# Patient Record
Sex: FEMALE | Race: BLACK OR AFRICAN AMERICAN | NOT HISPANIC OR LATINO | URBAN - METROPOLITAN AREA
[De-identification: names, ages, dates, MRNs, and addresses within clinical notes are randomized per-mention and may not be internally consistent; named-entity substitution may affect disease eponyms.]

---

## 2021-10-18 LAB — CRC RECOMMENDATION EXT: NORMAL

## 2022-12-09 DIAGNOSIS — M25.559 HIP PAIN: Primary | ICD-10-CM

## 2022-12-19 ENCOUNTER — OFFICE VISIT (OUTPATIENT)
Dept: ORTHOPEDICS | Facility: CLINIC | Age: 72
End: 2022-12-19
Payer: MEDICARE

## 2022-12-19 VITALS — BODY MASS INDEX: 22.69 KG/M2 | WEIGHT: 136.19 LBS | HEIGHT: 65 IN

## 2022-12-19 DIAGNOSIS — M16.12 PRIMARY OSTEOARTHRITIS OF LEFT HIP: Primary | ICD-10-CM

## 2022-12-19 PROCEDURE — 99203 PR OFFICE/OUTPT VISIT, NEW, LEVL III, 30-44 MIN: ICD-10-PCS | Mod: S$GLB,,, | Performed by: ORTHOPAEDIC SURGERY

## 2022-12-19 PROCEDURE — 1125F AMNT PAIN NOTED PAIN PRSNT: CPT | Mod: CPTII,S$GLB,, | Performed by: ORTHOPAEDIC SURGERY

## 2022-12-19 PROCEDURE — 99203 OFFICE O/P NEW LOW 30 MIN: CPT | Mod: S$GLB,,, | Performed by: ORTHOPAEDIC SURGERY

## 2022-12-19 PROCEDURE — 99999 PR PBB SHADOW E&M-EST. PATIENT-LVL II: CPT | Mod: PBBFAC,,, | Performed by: ORTHOPAEDIC SURGERY

## 2022-12-19 PROCEDURE — 1125F PR PAIN SEVERITY QUANTIFIED, PAIN PRESENT: ICD-10-PCS | Mod: CPTII,S$GLB,, | Performed by: ORTHOPAEDIC SURGERY

## 2022-12-19 PROCEDURE — 1159F PR MEDICATION LIST DOCUMENTED IN MEDICAL RECORD: ICD-10-PCS | Mod: CPTII,S$GLB,, | Performed by: ORTHOPAEDIC SURGERY

## 2022-12-19 PROCEDURE — 1159F MED LIST DOCD IN RCRD: CPT | Mod: CPTII,S$GLB,, | Performed by: ORTHOPAEDIC SURGERY

## 2022-12-19 PROCEDURE — 99999 PR PBB SHADOW E&M-EST. PATIENT-LVL II: ICD-10-PCS | Mod: PBBFAC,,, | Performed by: ORTHOPAEDIC SURGERY

## 2022-12-19 PROCEDURE — 3008F BODY MASS INDEX DOCD: CPT | Mod: CPTII,S$GLB,, | Performed by: ORTHOPAEDIC SURGERY

## 2022-12-19 PROCEDURE — 3008F PR BODY MASS INDEX (BMI) DOCUMENTED: ICD-10-PCS | Mod: CPTII,S$GLB,, | Performed by: ORTHOPAEDIC SURGERY

## 2022-12-19 RX ORDER — IBUPROFEN 800 MG/1
800 TABLET ORAL EVERY 6 HOURS PRN
COMMUNITY
End: 2024-03-05

## 2022-12-19 NOTE — PROGRESS NOTES
NEW PATIENT ORTHOPAEDIC: HIP    PRIMARY CARE PHYSICIAN: Celena Macedo NP   REFERRING PROVIDER: Asiya Guerrier NP  [address] [place] [address]  [place] [address]     ASSESSMENT & PLAN:    Impression:  Left Hip Severe Degenerative Osteoarthritis, Primary    Follow Up Plan: PRN     Non operative care:    Shraddha Rea has physical exam evidence of above and wishes to pursue an non-operative care. I am recommending the following: observation    Patient comes in with a greater than 2 year history of having left hip pain.  She has done extensive nonoperative treatments for this.  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  She is engaged in a lot a natural and holistic strategies including dry needling, acupuncture, chiropractors, supplements all which have assisted with her pain over the last few years.  She only has movement specific pain and is not having pain on a daily basis.  I advised her to continue her current symptomatic treatment strategies as they seem to be working.  Should these fail in the future her option would be consideration of a total hip replacement.    The patient has been ordered:  None    CONSULTS:   None    ACTIVE PROBLEM LIST  There is no problem list on file for this patient.          SUBJECTIVE    CHIEF COMPLAINT: Hip Pain    HPI:   Shraddha Rea is a 72 y.o. female here for evaluation and management of left hip pain. There is not a specific incident that brought about this pain. she has had progressive problems with the hip(s) starting 2 years ago and is progressing which is now interfering with activities which include: exercise    Currently the pain in the joint is mild with activity.  The pain is intermittent and is located in groin.  The pain is described as sharp. Relieving factors include over the counter medication  and repositioning. no associated Catching, Clicking, and Popping.     They do not report leg  "length inequality.     Shraddha Rea has no additional complaints.     PROGRESSIVE SYMPTOMS:  Pain limiting ability to stay fit and healthy    FUNCTIONAL STATUS:   Participate in recreational activities     PREVIOUS TREATMENTS:  Medical: Tylenol  Physical Therapy: Formal Physical Therapy for 6 weeks  Previous Orthopaedic Surgery: None    REVIEW OF SYSTEMS:  PAIN ASSESSMENT:  See HPI.  MUSCULOSKELETAL: See HPI.  OTHER 10 point review of systems is negative except as stated in HPI above    PAST MEDICAL HISTORY   has no past medical history on file.     PAST SURGICAL HISTORY   has a past surgical history that includes Hysterectomy and Appendectomy.     FAMILY HISTORY  family history is not on file.     SOCIAL HISTORY   reports that she has never smoked. She has never been exposed to tobacco smoke. She has never used smokeless tobacco. She reports current alcohol use of about 3.0 standard drinks per week. She reports that she does not use drugs.     ALLERGIES   Review of patient's allergies indicates:   Allergen Reactions    Aspirin     Sulfa (sulfonamide antibiotics)         MEDICATIONS   Current Outpatient Medications on File Prior to Visit   Medication Sig Dispense Refill    ibuprofen (ADVIL,MOTRIN) 800 MG tablet Take 800 mg by mouth every 6 (six) hours as needed for Pain.       No current facility-administered medications on file prior to visit.          PHYSICAL EXAM   height is 5' 5" (1.651 m) and weight is 61.8 kg (136 lb 3.2 oz).   Body mass index is 22.66 kg/m².      All other systems deferred.  GENERAL:  No acute distress  HABITUS: Normal  GAIT: Non-antalgic  SKIN: Normal     HIP EXAM:    left:   ROM:   Flexion: 120 degrees    Internal Rotation: 30 degrees    External Rotation: 30 degrees    Abduction: 45 degrees    Adduction: 20 degrees  No apparent leg length discrepancy    Palpation: no tenderness over greater trochanter, SI joint, ilioposas, IT band, gluteal musculature   Pain is reproduced with IR " of the hip  Strength: 5/5 hip flexion, abduction, knee flexion and extension   Straight leg raise: Negative   Neurovascular Status: Sensation intact to light touch in Sural, saphenous, SPN, DPN, Tibial nerve distribution  2+ pulse DP/PT, normal capillary refill, foot has normal warmth    DATA:  Diagnostic tests reviewed for today's visit:     AP pelvis, hip, and lateral radiographs shows narrowing of the superior joint space with sclerosis and osteophyte formation. The femoral neck is in varus position. The femoral head is spherical at superior margin. There is no signficant evidence of acetabular dysplasia and the femoral head remains covered.

## 2024-03-05 ENCOUNTER — OFFICE VISIT (OUTPATIENT)
Dept: FAMILY MEDICINE | Facility: CLINIC | Age: 74
End: 2024-03-05
Payer: MEDICARE

## 2024-03-05 ENCOUNTER — PATIENT OUTREACH (OUTPATIENT)
Dept: ADMINISTRATIVE | Facility: HOSPITAL | Age: 74
End: 2024-03-05
Payer: MEDICARE

## 2024-03-05 VITALS
TEMPERATURE: 98 F | OXYGEN SATURATION: 96 % | HEIGHT: 65 IN | WEIGHT: 141.13 LBS | HEART RATE: 75 BPM | SYSTOLIC BLOOD PRESSURE: 136 MMHG | BODY MASS INDEX: 23.52 KG/M2 | RESPIRATION RATE: 18 BRPM | DIASTOLIC BLOOD PRESSURE: 76 MMHG

## 2024-03-05 DIAGNOSIS — Z00.00 HEALTHCARE MAINTENANCE: Primary | ICD-10-CM

## 2024-03-05 DIAGNOSIS — M25.559 HIP PAIN, UNSPECIFIED LATERALITY: ICD-10-CM

## 2024-03-05 DIAGNOSIS — Z12.39 ENCOUNTER FOR SCREENING FOR MALIGNANT NEOPLASM OF BREAST, UNSPECIFIED SCREENING MODALITY: ICD-10-CM

## 2024-03-05 DIAGNOSIS — Z12.11 COLON CANCER SCREENING: ICD-10-CM

## 2024-03-05 DIAGNOSIS — T78.40XS ALLERGY, SEQUELA: ICD-10-CM

## 2024-03-05 DIAGNOSIS — R79.9 ABNORMAL FINDING OF BLOOD CHEMISTRY, UNSPECIFIED: ICD-10-CM

## 2024-03-05 DIAGNOSIS — Z78.0 ASYMPTOMATIC POSTMENOPAUSAL STATE: ICD-10-CM

## 2024-03-05 PROCEDURE — 3008F BODY MASS INDEX DOCD: CPT | Mod: CPTII,S$GLB,, | Performed by: INTERNAL MEDICINE

## 2024-03-05 PROCEDURE — 99204 OFFICE O/P NEW MOD 45 MIN: CPT | Mod: S$GLB,,, | Performed by: INTERNAL MEDICINE

## 2024-03-05 PROCEDURE — 99999 PR PBB SHADOW E&M-EST. PATIENT-LVL V: CPT | Mod: PBBFAC,,, | Performed by: INTERNAL MEDICINE

## 2024-03-05 PROCEDURE — 1159F MED LIST DOCD IN RCRD: CPT | Mod: CPTII,S$GLB,, | Performed by: INTERNAL MEDICINE

## 2024-03-05 PROCEDURE — 3075F SYST BP GE 130 - 139MM HG: CPT | Mod: CPTII,S$GLB,, | Performed by: INTERNAL MEDICINE

## 2024-03-05 PROCEDURE — 3078F DIAST BP <80 MM HG: CPT | Mod: CPTII,S$GLB,, | Performed by: INTERNAL MEDICINE

## 2024-03-05 PROCEDURE — 1101F PT FALLS ASSESS-DOCD LE1/YR: CPT | Mod: CPTII,S$GLB,, | Performed by: INTERNAL MEDICINE

## 2024-03-05 PROCEDURE — 3288F FALL RISK ASSESSMENT DOCD: CPT | Mod: CPTII,S$GLB,, | Performed by: INTERNAL MEDICINE

## 2024-03-05 PROCEDURE — 1160F RVW MEDS BY RX/DR IN RCRD: CPT | Mod: CPTII,S$GLB,, | Performed by: INTERNAL MEDICINE

## 2024-03-05 RX ORDER — MELOXICAM 7.5 MG/1
7.5 TABLET ORAL DAILY PRN
COMMUNITY
Start: 2024-01-24

## 2024-03-05 RX ORDER — AMLODIPINE BESYLATE 5 MG/1
5 TABLET ORAL
COMMUNITY
Start: 2023-10-23 | End: 2024-03-05

## 2024-03-05 NOTE — PROGRESS NOTES
Mammogram and DEXA updated in . JEREMY sent requesting colonoscopy. Immunization's updated/triggered.

## 2024-03-05 NOTE — PROGRESS NOTES
HISTORY OF PRESENT ILLNESS:  Shraddha Rea is a 73 y.o. female who presents to the clinic today for Establish Care    My first encounter with patient.    Previously seen at [facility] and establishing with us.    Notes hip pain for which saw Dr. Melendez before.  Symptoms are stable at this time.  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  Does go to a chiropractor as well.  Had dry needling at [facility] which helped.  Attended PT last year there for hip.    No other complaints noted.    PAST MEDICAL HISTORY:  No past medical history on file.    PAST SURGICAL HISTORY:  Past Surgical History:   Procedure Laterality Date    APPENDECTOMY      HYSTERECTOMY         SOCIAL HISTORY:  Social History     Socioeconomic History    Marital status:     Number of children: 2   Tobacco Use    Smoking status: Never     Passive exposure: Never    Smokeless tobacco: Never   Substance and Sexual Activity    Alcohol use: Not Currently     Comment: occasional social    Drug use: Never    Sexual activity: Not Currently     Social Determinants of Health     Financial Resource Strain: Low Risk  (2/27/2024)    Overall Financial Resource Strain (CARDIA)     Difficulty of Paying Living Expenses: Not hard at all   Food Insecurity: No Food Insecurity (2/27/2024)    Hunger Vital Sign     Worried About Running Out of Food in the Last Year: Never true     Ran Out of Food in the Last Year: Never true   Transportation Needs: No Transportation Needs (2/27/2024)    PRAPARE - Transportation     Lack of Transportation (Medical): No     Lack of Transportation (Non-Medical): No   Physical Activity: Sufficiently Active (2/27/2024)    Exercise Vital Sign     Days of Exercise per Week: 3 days     Minutes of Exercise per Session: 60 min   Stress: No Stress Concern Present (2/27/2024)    [employer] [place] [employer] - Occupational Stress Questionnaire     Feeling of Stress : Not at all   Social Connections:  Unknown (2/27/2024)    Social Connection and Isolation Panel [NHANES]     Frequency of Communication with Friends and Family: More than three times a week     Frequency of Social Gatherings with Friends and Family: Twice a week     Active Member of Clubs or Organizations: Yes     Attends Club or Organization Meetings: More than 4 times per year     Marital Status:    Housing Stability: Low Risk  (2/27/2024)    Housing Stability Vital Sign     Unable to Pay for Housing in the Last Year: No     Number of Places Lived in the Last Year: 1     Unstable Housing in the Last Year: No       FAMILY HISTORY:  Family History   Problem Relation Age of Onset    Breast cancer Mother 60    Lung cancer Sister     Lupus Sister     Cancer Maternal Grandmother        ALLERGIES AND MEDICATIONS: updated and reviewed.  Review of patient's allergies indicates:   Allergen Reactions    Mold Shortness Of Breath    Aspirin     Grass pollen-kiran grass standard Itching    Sulfa (sulfonamide antibiotics)     Milk containing products (dairy) Nausea And Vomiting and Other (See Comments)     Medication List with Changes/Refills   Current Medications    MELOXICAM (MOBIC) 7.5 MG TABLET    Take 7.5 mg by mouth daily as needed for Pain (take with food and plenty of water).   Discontinued Medications    AMLODIPINE (NORVASC) 5 MG TABLET    Take 5 mg by mouth.    IBUPROFEN (ADVIL,MOTRIN) 800 MG TABLET    Take 800 mg by mouth every 6 (six) hours as needed for Pain.          CARE TEAM:  Patient Care Team:  Jaswant Live MD as PCP - General (Internal Medicine)         REVIEW OF SYSTEMS:  Review of Systems   Constitutional:  Negative for chills and fever.   HENT:  Negative for congestion and postnasal drip.    Eyes:  Negative for photophobia and visual disturbance.   Respiratory:  Negative for cough and shortness of breath.    Cardiovascular:  Negative for chest pain and palpitations.   Gastrointestinal:  Negative for nausea and vomiting.    Genitourinary:  Negative for dysuria and frequency.   Musculoskeletal:  Positive for arthralgias (left hip; right hip and gluteal area when sitting).   Neurological:  Negative for light-headedness and headaches.   Psychiatric/Behavioral:  Negative for dysphoric mood. The patient is not nervous/anxious.          PHYSICAL EXAM:   Vitals:    03/05/24 1018   BP: 136/76   Pulse: 75   Resp: 18   Temp: 98.3 °F (36.8 °C)             Body mass index is 23.48 kg/m².     General appearance - alert, well appearing, and in no distress and normal appearing weight  Mental status - normal mood, behavior, speech, dress, motor activity, and thought processes  Eyes - sclera anicteric, left eye normal, right eye normal  Chest - clear to auscultation, no wheezes, rales or rhonchi, symmetric air entry  Heart - normal rate, regular rhythm, normal S1, S2, no murmurs, rubs, clicks or gallops  Neurological - alert, oriented, normal speech, no focal findings or movement disorder noted  Extremities - no pedal edema, no clubbing or cyanosis      ASSESSMENT AND PLAN:  Healthcare maintenance  -     Hemoglobin A1C; Future; Expected date: 03/05/2024  -     Comprehensive Metabolic Panel; Future; Expected date: 03/05/2024  -     Lipid Panel; Future; Expected date: 03/05/2024  -     CBC Auto Differential; Future; Expected date: 03/05/2024  -     TSH; Future; Expected date: 03/05/2024  -     Hepatitis C Antibody; Future; Expected date: 03/05/2024    Encounter for screening for malignant neoplasm of breast, unspecified screening modality  Colon cancer screening  Asymptomatic postmenopausal state        - Obtain prior records [facility].    Hip pain, unspecified laterality  -     X-Ray Hips Bilateral 2 View Inc AP Pelvis; Future; Expected date: 03/05/2024    Allergy, sequela  -     Ambulatory referral/consult to Allergy; Future; Expected date: 03/12/2024    Abnormal finding of blood chemistry, unspecified  -     Hemoglobin A1C; Future; Expected date:  03/05/2024  -     Lipid Panel; Future; Expected date: 03/05/2024  -     CBC Auto Differential; Future; Expected date: 03/05/2024  -     TSH; Future; Expected date: 03/05/2024              Follow up 6 months or sooner as needed.

## 2024-03-05 NOTE — PROGRESS NOTES
Health Maintenance Due   Topic     Hepatitis C Screening  Consult pcp    Lipid Panel  Consult pcp    TETANUS VACCINE  Pt decline    Colorectal Cancer Screening  Consult pcp    RSV Vaccine (Age 60+ and Pregnant patients) (1 - 1-dose 60+ series) Not offered at this office    Pneumococcal Vaccines (Age 65+) (1 of 1 - PCV) Pt decline

## 2024-03-05 NOTE — LETTER
AUTHORIZATION FOR RELEASE OF   CONFIDENTIAL INFORMATION        We are seeing Shraddha Rea, date of birth 1950, in the clinic at [facility]. Jaswant Live MD is the patient's PCP. Shraddha Rea has an outstanding lab/procedure at the time we reviewed her chart. In order to help keep her health information updated, she has authorized us to request the following medical record(s):        (  )  MAMMOGRAM                                      ( X )  COLONOSCOPY      (  )  PAP SMEAR                                          (  )  OUTSIDE LAB RESULTS     (  )  DEXA SCAN                                          (  )  EYE EXAM            (  )  FOOT EXAM                                          (  )  ENTIRE RECORD     (  )  OUTSIDE IMMUNIZATIONS                 (  )  _______________         Please fax records to [facility], Anand, Kiran K., MD, FAX [phone] [address] [facility] [address]       If you have any questions, please contact Zohaib Fuller MA,[facility] at [phone].              Patient Name: Shraddha Rea  : 1950  Patient Phone #: [phone]

## 2024-03-06 ENCOUNTER — PATIENT OUTREACH (OUTPATIENT)
Dept: ADMINISTRATIVE | Facility: HOSPITAL | Age: 74
End: 2024-03-06
Payer: MEDICARE

## 2024-03-19 ENCOUNTER — LAB VISIT (OUTPATIENT)
Dept: LAB | Facility: HOSPITAL | Age: 74
End: 2024-03-19
Payer: MEDICARE

## 2024-03-19 DIAGNOSIS — R79.9 ABNORMAL FINDING OF BLOOD CHEMISTRY, UNSPECIFIED: ICD-10-CM

## 2024-03-19 DIAGNOSIS — Z00.00 HEALTHCARE MAINTENANCE: ICD-10-CM

## 2024-03-19 LAB
ALBUMIN SERPL BCP-MCNC: 3.8 G/DL (ref 3.5–5.2)
ALP SERPL-CCNC: 107 U/L (ref 55–135)
ALT SERPL W/O P-5'-P-CCNC: 9 U/L (ref 10–44)
ANION GAP SERPL CALC-SCNC: 3 MMOL/L (ref 8–16)
AST SERPL-CCNC: 22 U/L (ref 10–40)
BASOPHILS # BLD AUTO: 0.02 K/UL (ref 0–0.2)
BASOPHILS NFR BLD: 0.5 % (ref 0–1.9)
BILIRUB SERPL-MCNC: 0.5 MG/DL (ref 0.1–1)
BUN SERPL-MCNC: 12 MG/DL (ref 8–23)
CALCIUM SERPL-MCNC: 9.4 MG/DL (ref 8.7–10.5)
CHLORIDE SERPL-SCNC: 109 MMOL/L (ref 95–110)
CHOLEST SERPL-MCNC: 215 MG/DL (ref 120–199)
CHOLEST/HDLC SERPL: 3.4 {RATIO} (ref 2–5)
CO2 SERPL-SCNC: 30 MMOL/L (ref 23–29)
CREAT SERPL-MCNC: 0.9 MG/DL (ref 0.5–1.4)
DIFFERENTIAL METHOD BLD: NORMAL
EOSINOPHIL # BLD AUTO: 0.1 K/UL (ref 0–0.5)
EOSINOPHIL NFR BLD: 2.5 % (ref 0–8)
ERYTHROCYTE [DISTWIDTH] IN BLOOD BY AUTOMATED COUNT: 13 % (ref 11.5–14.5)
EST. GFR  (NO RACE VARIABLE): >60 ML/MIN/1.73 M^2
ESTIMATED AVG GLUCOSE: 108 MG/DL (ref 68–131)
GLUCOSE SERPL-MCNC: 87 MG/DL (ref 70–110)
HBA1C MFR BLD: 5.4 % (ref 4–5.6)
HCT VFR BLD AUTO: 38.2 % (ref 37–48.5)
HCV AB SERPL QL IA: NORMAL
HDLC SERPL-MCNC: 64 MG/DL (ref 40–75)
HDLC SERPL: 29.8 % (ref 20–50)
HGB BLD-MCNC: 12.6 G/DL (ref 12–16)
IMM GRANULOCYTES # BLD AUTO: 0 K/UL (ref 0–0.04)
IMM GRANULOCYTES NFR BLD AUTO: 0 % (ref 0–0.5)
LDLC SERPL CALC-MCNC: 129.6 MG/DL (ref 63–159)
LYMPHOCYTES # BLD AUTO: 1.4 K/UL (ref 1–4.8)
LYMPHOCYTES NFR BLD: 36.3 % (ref 18–48)
MCH RBC QN AUTO: 30.9 PG (ref 27–31)
MCHC RBC AUTO-ENTMCNC: 33 G/DL (ref 32–36)
MCV RBC AUTO: 94 FL (ref 82–98)
MONOCYTES # BLD AUTO: 0.3 K/UL (ref 0.3–1)
MONOCYTES NFR BLD: 8.4 % (ref 4–15)
NEUTROPHILS # BLD AUTO: 2.1 K/UL (ref 1.8–7.7)
NEUTROPHILS NFR BLD: 52.3 % (ref 38–73)
NONHDLC SERPL-MCNC: 151 MG/DL
NRBC BLD-RTO: 0 /100 WBC
PLATELET # BLD AUTO: 237 K/UL (ref 150–450)
PMV BLD AUTO: 10.5 FL (ref 9.2–12.9)
POTASSIUM SERPL-SCNC: 4.5 MMOL/L (ref 3.5–5.1)
PROT SERPL-MCNC: 6.6 G/DL (ref 6–8.4)
RBC # BLD AUTO: 4.08 M/UL (ref 4–5.4)
SODIUM SERPL-SCNC: 142 MMOL/L (ref 136–145)
TRIGL SERPL-MCNC: 107 MG/DL (ref 30–150)
TSH SERPL DL<=0.005 MIU/L-ACNC: 2.54 UIU/ML (ref 0.4–4)
WBC # BLD AUTO: 3.94 K/UL (ref 3.9–12.7)

## 2024-03-19 PROCEDURE — 84443 ASSAY THYROID STIM HORMONE: CPT | Mod: GA | Performed by: INTERNAL MEDICINE

## 2024-03-19 PROCEDURE — 36415 COLL VENOUS BLD VENIPUNCTURE: CPT | Mod: PN | Performed by: INTERNAL MEDICINE

## 2024-03-19 PROCEDURE — 86803 HEPATITIS C AB TEST: CPT | Performed by: INTERNAL MEDICINE

## 2024-03-19 PROCEDURE — 80061 LIPID PANEL: CPT | Performed by: INTERNAL MEDICINE

## 2024-03-19 PROCEDURE — 85025 COMPLETE CBC W/AUTO DIFF WBC: CPT | Performed by: INTERNAL MEDICINE

## 2024-03-19 PROCEDURE — 80053 COMPREHEN METABOLIC PANEL: CPT | Performed by: INTERNAL MEDICINE

## 2024-03-19 PROCEDURE — 83036 HEMOGLOBIN GLYCOSYLATED A1C: CPT | Performed by: INTERNAL MEDICINE

## 2024-03-25 ENCOUNTER — PATIENT MESSAGE (OUTPATIENT)
Dept: ORTHOPEDICS | Facility: CLINIC | Age: 74
End: 2024-03-25

## 2024-03-25 ENCOUNTER — OFFICE VISIT (OUTPATIENT)
Dept: ORTHOPEDICS | Facility: CLINIC | Age: 74
End: 2024-03-25
Payer: MEDICARE

## 2024-03-25 VITALS — HEIGHT: 65 IN | BODY MASS INDEX: 23.52 KG/M2 | WEIGHT: 141.13 LBS

## 2024-03-25 DIAGNOSIS — M16.12 PRIMARY OSTEOARTHRITIS OF LEFT HIP: Primary | ICD-10-CM

## 2024-03-25 PROCEDURE — 1125F AMNT PAIN NOTED PAIN PRSNT: CPT | Mod: CPTII,S$GLB,, | Performed by: ORTHOPAEDIC SURGERY

## 2024-03-25 PROCEDURE — 3008F BODY MASS INDEX DOCD: CPT | Mod: CPTII,S$GLB,, | Performed by: ORTHOPAEDIC SURGERY

## 2024-03-25 PROCEDURE — 99213 OFFICE O/P EST LOW 20 MIN: CPT | Mod: S$GLB,,, | Performed by: ORTHOPAEDIC SURGERY

## 2024-03-25 PROCEDURE — 99999 PR PBB SHADOW E&M-EST. PATIENT-LVL II: CPT | Mod: PBBFAC,,, | Performed by: ORTHOPAEDIC SURGERY

## 2024-03-25 PROCEDURE — 3044F HG A1C LEVEL LT 7.0%: CPT | Mod: CPTII,S$GLB,, | Performed by: ORTHOPAEDIC SURGERY

## 2024-03-25 PROCEDURE — 1159F MED LIST DOCD IN RCRD: CPT | Mod: CPTII,S$GLB,, | Performed by: ORTHOPAEDIC SURGERY

## 2024-03-25 RX ORDER — DICLOFENAC SODIUM 75 MG/1
75 TABLET, DELAYED RELEASE ORAL 2 TIMES DAILY
Qty: 60 TABLET | Refills: 1 | Status: SHIPPED | OUTPATIENT
Start: 2024-03-25

## 2024-03-25 NOTE — PROGRESS NOTES
EST PATIENT ORTHOPAEDIC: HIP    PRIMARY CARE PHYSICIAN: Jaswant Live MD   REFERRING PROVIDER: No referring provider defined for this encounter.     ASSESSMENT & PLAN:    Impression:  Left Hip Severe Degenerative Osteoarthritis, Primary    Follow Up Plan: PRN     Non operative care:    Shraddha Rea has physical exam evidence of above and wishes to pursue an non-operative care. I am recommending the following: observation, consideration of surgery in the future    To review:  Patient comes in with a greater than 3 year history of having left hip pain.  She has done extensive nonoperative treatments for this.  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  She is engaged in a lot a natural and holistic strategies including dry needling, acupuncture, chiropractors, supplements all which have assisted with her pain over the last few years.  She only has movement specific pain and is not having pain on a daily basis.  I advised her to continue her current symptomatic treatment strategies as they seem to be working.  Will add diclofenac to this. Weather affects her pain. She is working [place] [facility] upcoming. No limited by weight pain in general. She has also been aided by dry needling in the past. Should these fail in the future her option would be consideration of a total hip replacement.     The patient has been ordered:  Pain prescription    CONSULTS:   None    ACTIVE PROBLEM LIST  There is no problem list on file for this patient.          SUBJECTIVE    CHIEF COMPLAINT: Hip Pain    HPI:   Shraddha Rea is a 73 y.o. female here for evaluation and management of left hip pain. There is not a specific incident that brought about this pain. she has had progressive problems with the hip(s) starting 2 years ago and is progressing which is now interfering with activities which include: exercise    Currently the pain in the joint is mild with  activity.  The pain is intermittent and is located in groin.  The pain is described as sharp. Relieving factors include over the counter medication  and repositioning. no associated Catching, Clicking, and Popping.     They do not report leg length inequality.     Shraddha Rea has no additional complaints.     3/25/24:  Patient here for follow up of ongoing left hip pain.  Last saw about 2 years ago.  Pain is similar in nature but ongoing and wanted follow up established.    PROGRESSIVE SYMPTOMS:  Pain limiting ability to stay fit and healthy    FUNCTIONAL STATUS:   Participate in recreational activities     PREVIOUS TREATMENTS:  Medical: Tylenol  Physical Therapy: Formal Physical Therapy for 6 weeks  Previous Orthopaedic Surgery: None    REVIEW OF SYSTEMS:  PAIN ASSESSMENT:  See HPI.  MUSCULOSKELETAL: See HPI.  OTHER 10 point review of systems is negative except as stated in HPI above    PAST MEDICAL HISTORY   has a past medical history of Personal history of colonic polyps (10/18/2021).     PAST SURGICAL HISTORY   has a past surgical history that includes Hysterectomy and Appendectomy.     FAMILY HISTORY  family history includes Breast cancer (age of onset: 60) in her mother; Cancer in her maternal grandmother; Lung cancer in her sister; Lupus in her sister.     SOCIAL HISTORY   reports that she has never smoked. She has never been exposed to tobacco smoke. She has never used smokeless tobacco. She reports that she does not currently use alcohol. She reports that she does not use drugs.     ALLERGIES   Review of patient's allergies indicates:   Allergen Reactions    Mold Shortness Of Breath    Aspirin     Grass pollen-june grass standard Itching    Sulfa (sulfonamide antibiotics)     Milk containing products (dairy) Nausea And Vomiting and Other (See Comments)        MEDICATIONS   Current Outpatient Medications on File Prior to Visit   Medication Sig Dispense Refill    meloxicam (MOBIC) 7.5 MG tablet Take  "7.5 mg by mouth daily as needed for Pain (take with food and plenty of water).       No current facility-administered medications on file prior to visit.          PHYSICAL EXAM   height is 5' 5" (1.651 m) and weight is 64 kg (141 lb 1.5 oz).   Body mass index is 23.48 kg/m².      All other systems deferred.  GENERAL:  No acute distress  HABITUS: Normal  GAIT: Non-antalgic  SKIN: Normal     HIP EXAM:    left:   ROM:   Flexion: 120 degrees    Internal Rotation: 30 degrees    External Rotation: 30 degrees    Abduction: 45 degrees    Adduction: 20 degrees  No apparent leg length discrepancy    Palpation: no tenderness over greater trochanter, SI joint, ilioposas, IT band, gluteal musculature   Pain is reproduced with IR of the hip  Strength: 5/5 hip flexion, abduction, knee flexion and extension   Straight leg raise: Negative   Neurovascular Status: Sensation intact to light touch in Sural, saphenous, SPN, DPN, Tibial nerve distribution  2+ pulse DP/PT, normal capillary refill, foot has normal warmth    DATA:  Diagnostic tests reviewed for today's visit:     AP pelvis, hip, and lateral radiographs shows narrowing of the medial joint space with sclerosis and osteophyte formation. The femoral neck is in varus position. The femoral head is spherical at superior margin. There is no signficant evidence of acetabular dysplasia and the femoral head remains covered.      "

## 2024-04-30 ENCOUNTER — PATIENT MESSAGE (OUTPATIENT)
Dept: ORTHOPEDICS | Facility: CLINIC | Age: 74
End: 2024-04-30
Payer: MEDICARE

## 2024-07-08 ENCOUNTER — PATIENT MESSAGE (OUTPATIENT)
Dept: ORTHOPEDICS | Facility: CLINIC | Age: 74
End: 2024-07-08
Payer: MEDICARE

## 2024-07-09 DIAGNOSIS — M16.12 PRIMARY OSTEOARTHRITIS OF LEFT HIP: ICD-10-CM

## 2024-07-09 RX ORDER — DICLOFENAC SODIUM 75 MG/1
75 TABLET, DELAYED RELEASE ORAL 2 TIMES DAILY
Qty: 60 TABLET | Refills: 1 | Status: SHIPPED | OUTPATIENT
Start: 2024-07-09

## 2024-07-10 ENCOUNTER — PATIENT MESSAGE (OUTPATIENT)
Facility: CLINIC | Age: 74
End: 2024-07-10

## 2024-07-10 ENCOUNTER — OFFICE VISIT (OUTPATIENT)
Facility: CLINIC | Age: 74
End: 2024-07-10
Payer: MEDICARE

## 2024-07-10 VITALS
HEART RATE: 76 BPM | WEIGHT: 136.44 LBS | SYSTOLIC BLOOD PRESSURE: 158 MMHG | OXYGEN SATURATION: 99 % | DIASTOLIC BLOOD PRESSURE: 78 MMHG | RESPIRATION RATE: 14 BRPM | BODY MASS INDEX: 23.29 KG/M2 | TEMPERATURE: 98 F | HEIGHT: 64 IN

## 2024-07-10 DIAGNOSIS — Z00.00 ENCOUNTER FOR PREVENTIVE HEALTH EXAMINATION: ICD-10-CM

## 2024-07-10 DIAGNOSIS — Z00.00 ENCOUNTER FOR MEDICARE ANNUAL WELLNESS EXAM: ICD-10-CM

## 2024-07-10 DIAGNOSIS — R03.0 WHITE COAT SYNDROME WITHOUT DIAGNOSIS OF HYPERTENSION: ICD-10-CM

## 2024-07-10 DIAGNOSIS — Z71.89 ADVANCED DIRECTIVES, COUNSELING/DISCUSSION: ICD-10-CM

## 2024-07-10 DIAGNOSIS — M16.12 OSTEOARTHRITIS OF LEFT HIP, UNSPECIFIED OSTEOARTHRITIS TYPE: ICD-10-CM

## 2024-07-10 PROBLEM — I70.0 ATHEROSCLEROSIS OF AORTA: Status: ACTIVE | Noted: 2024-07-10

## 2024-07-10 PROCEDURE — 1101F PT FALLS ASSESS-DOCD LE1/YR: CPT | Mod: CPTII,S$GLB,, | Performed by: NURSE PRACTITIONER

## 2024-07-10 PROCEDURE — 99999 PR PBB SHADOW E&M-EST. PATIENT-LVL IV: CPT | Mod: PBBFAC,,, | Performed by: NURSE PRACTITIONER

## 2024-07-10 PROCEDURE — G0438 PPPS, INITIAL VISIT: HCPCS | Mod: S$GLB,,, | Performed by: NURSE PRACTITIONER

## 2024-07-10 PROCEDURE — 3288F FALL RISK ASSESSMENT DOCD: CPT | Mod: CPTII,S$GLB,, | Performed by: NURSE PRACTITIONER

## 2024-07-10 PROCEDURE — 1170F FXNL STATUS ASSESSED: CPT | Mod: CPTII,S$GLB,, | Performed by: NURSE PRACTITIONER

## 2024-07-10 PROCEDURE — 3078F DIAST BP <80 MM HG: CPT | Mod: CPTII,S$GLB,, | Performed by: NURSE PRACTITIONER

## 2024-07-10 PROCEDURE — 3077F SYST BP >= 140 MM HG: CPT | Mod: CPTII,S$GLB,, | Performed by: NURSE PRACTITIONER

## 2024-07-10 PROCEDURE — 3044F HG A1C LEVEL LT 7.0%: CPT | Mod: CPTII,S$GLB,, | Performed by: NURSE PRACTITIONER

## 2024-07-10 RX ORDER — CETIRIZINE HYDROCHLORIDE 10 MG/1
10 TABLET ORAL DAILY PRN
COMMUNITY

## 2024-07-10 RX ORDER — FEXOFENADINE HCL AND PSEUDOEPHEDRINE HCI 60; 120 MG/1; MG/1
1 TABLET, EXTENDED RELEASE ORAL 2 TIMES DAILY
COMMUNITY
End: 2024-07-10

## 2024-07-10 RX ORDER — FEXOFENADINE HCL AND PSEUDOEPHEDRINE HCI 180; 240 MG/1; MG/1
1 TABLET, EXTENDED RELEASE ORAL DAILY
COMMUNITY
End: 2024-07-10

## 2024-07-10 RX ORDER — FLUTICASONE PROPIONATE 50 MCG
1 SPRAY, SUSPENSION (ML) NASAL
COMMUNITY

## 2024-07-10 NOTE — PROGRESS NOTES
"Shraddha Rea presented for an initial Medicare AWV today. The following components were reviewed and updated:    Medical history  Family History  Social history  Allergies and Current Medications  Health Risk Assessment  Health Maintenance  Care Team    **See Completed Assessments for Annual Wellness visit with in the encounter summary    The following assessments were completed:  Living Situation  Depression Screening  Cognitive function Screening  Timed Get Up Test  Whisper Test  ADL  PAQ      Opioid documentation:      Patient does not have a current opioid prescription.          Vitals:    07/10/24 1014 07/10/24 1044   BP: (!) 180/90 (!) 158/78   BP Location: Left arm Left arm   Patient Position: Sitting Sitting   BP Method: Medium (Manual) Medium (Manual)   Pulse: 76    Resp: 14    Temp: 98.4 °F (36.9 °C)    TempSrc: Oral    SpO2: 99%    Weight: 61.9 kg (136 lb 7.4 oz)    Height: 5' 4" (1.626 m)      Body mass index is 23.42 kg/m².     Review of Systems   Constitutional:  Negative for chills, fever and weight loss.   HENT:  Negative for ear discharge, ear pain and tinnitus.    Eyes:  Negative for blurred vision and double vision.   Respiratory:  Negative for cough and shortness of breath.    Cardiovascular:  Negative for chest pain, palpitations, orthopnea and leg swelling.   Gastrointestinal:  Negative for constipation and diarrhea.   Genitourinary:  Negative for dysuria and hematuria.   Musculoskeletal:  Positive for joint pain. Negative for back pain and myalgias.   Skin:  Negative for itching and rash.   Neurological:  Negative for dizziness and headaches.   Endo/Heme/Allergies:  Does not bruise/bleed easily.   Psychiatric/Behavioral:  Negative for depression. The patient does not have insomnia.       Physical Exam  Constitutional:       General: She is not in acute distress.     Appearance: She is not ill-appearing, toxic-appearing or diaphoretic.   HENT:      Head: Normocephalic and atraumatic.      " Right Ear: External ear normal.      Left Ear: External ear normal.      Nose: No congestion or rhinorrhea.   Eyes:      Pupils: Pupils are equal, round, and reactive to light.   Cardiovascular:      Rate and Rhythm: Normal rate and regular rhythm.   Pulmonary:      Effort: No respiratory distress.      Breath sounds: No wheezing.   Abdominal:      General: Bowel sounds are normal.   Musculoskeletal:         General: Normal range of motion.      Cervical back: Normal range of motion.   Skin:     General: Skin is warm and dry.   Neurological:      General: No focal deficit present.      Mental Status: She is oriented to person, place, and time.   Psychiatric:         Mood and Affect: Mood normal.         Thought Content: Thought content normal.       Media Information    File Link    Scan on 7/10/2024 11:12 AM by Lakeisha Kellogg APRN: Clock Draw Test      Diagnoses and health risks identified today and associated recommendations/orders:  1. Encounter for Medicare annual wellness exam  -Counseled on age appropriate medical preventative services including age appropriate cancer screenings, age appropriate eye and dental exams, over all nutritional health, need for a consistent exercise regimen, and an over all push towards maintaining a vigorous and active lifestyle.  Counseled on age appropriate vaccines and discussed upcoming health care needs based on age/gender. Discussed good sleep hygiene and stress management.        2. Encounter for preventive health examination  -Counseled on age appropriate medical preventative services including age appropriate cancer screenings, age appropriate eye and dental exams, over all nutritional health, need for a consistent exercise regimen, and an over all push towards maintaining a vigorous and active lifestyle.  Counseled on age appropriate vaccines and discussed upcoming health care needs based on age/gender. Discussed good sleep hygiene and stress management.         3. Osteoarthritis of left hip, unspecified osteoarthritis type  -Continue current regimen  -Scheduled for Hip Replacement in September 2024    4. White coat syndrome without diagnosis of hypertension  -BP elevated in clinic; Denies cardiac symptoms  -Patient advised to monitor BP at home, keep a log, and send to PCP    5. Advanced directives, counseling/discussion  -I offered to discuss advanced care planning, including how to pick a person who would make decisions for you if you were unable to make them for yourself, called a health care power of , and what kind of decisions you might make such as use of life sustaining treatments such as ventilators and tube feeding when faced with a life limiting illness recorded on a living will that they will need to know. (How you want to be cared for as you near the end of your natural life)   -Patient has an Advanced Directive      Provided Shraddha with a 5-10 year written screening schedule and personal prevention plan. Recommendations were developed using the USPSTF age appropriate recommendations. Education, counseling, and referrals were provided as needed.  After Visit Summary printed and given to patient which includes a list of additional screenings\tests needed.    Follow up in about 1 year (around 7/10/2025) for AWV.      FREDY Casper

## 2024-07-10 NOTE — PATIENT INSTRUCTIONS
It was a pleasure to meet you.      Follow up in 1 year for AWV.     Please Follow up with PCP for Routine Care.     Monitor BP at home, keep a log, and send to PCP.         The following information is provided to all patients.  This information is to help you find resources for any of the problems found today that may be affecting your health:                Living healthy guide: www.[facility].[place].[facility]       Understanding Diabetes: www.diabetes.org       Eating healthy: www.cdc.gov/healthyweight      CDC home safety checklist: www.cdc.gov/steadi/patient.html      Agency on Aging: www.goea.[place].[facility]       Alcoholics anonymous (AA): www.aa.org      Physical Activity: www.henrry.nih.gov/ta4anyq       Tobacco use: www.quitwithusla.org

## 2024-07-24 ENCOUNTER — PATIENT MESSAGE (OUTPATIENT)
Facility: CLINIC | Age: 74
End: 2024-07-24
Payer: MEDICARE

## 2024-07-24 ENCOUNTER — PATIENT MESSAGE (OUTPATIENT)
Dept: ORTHOPEDICS | Facility: CLINIC | Age: 74
End: 2024-07-24
Payer: MEDICARE

## 2024-08-05 ENCOUNTER — PATIENT OUTREACH (OUTPATIENT)
Dept: ADMINISTRATIVE | Facility: HOSPITAL | Age: 74
End: 2024-08-05
Payer: MEDICARE

## 2024-08-10 ENCOUNTER — PATIENT MESSAGE (OUTPATIENT)
Dept: FAMILY MEDICINE | Facility: CLINIC | Age: 74
End: 2024-08-10
Payer: MEDICARE

## 2024-08-22 ENCOUNTER — OFFICE VISIT (OUTPATIENT)
Dept: ORTHOPEDICS | Facility: CLINIC | Age: 74
End: 2024-08-22
Payer: MEDICARE

## 2024-08-22 ENCOUNTER — PATIENT MESSAGE (OUTPATIENT)
Dept: ORTHOPEDICS | Facility: CLINIC | Age: 74
End: 2024-08-22

## 2024-08-22 VITALS — HEIGHT: 64 IN | WEIGHT: 136.44 LBS | BODY MASS INDEX: 23.29 KG/M2

## 2024-08-22 DIAGNOSIS — M16.12 PRIMARY OSTEOARTHRITIS OF LEFT HIP: Primary | ICD-10-CM

## 2024-08-22 PROCEDURE — 1159F MED LIST DOCD IN RCRD: CPT | Mod: CPTII,S$GLB,, | Performed by: ORTHOPAEDIC SURGERY

## 2024-08-22 PROCEDURE — 3008F BODY MASS INDEX DOCD: CPT | Mod: CPTII,S$GLB,, | Performed by: ORTHOPAEDIC SURGERY

## 2024-08-22 PROCEDURE — 99214 OFFICE O/P EST MOD 30 MIN: CPT | Mod: S$GLB,,, | Performed by: ORTHOPAEDIC SURGERY

## 2024-08-22 PROCEDURE — 1125F AMNT PAIN NOTED PAIN PRSNT: CPT | Mod: CPTII,S$GLB,, | Performed by: ORTHOPAEDIC SURGERY

## 2024-08-22 PROCEDURE — 3044F HG A1C LEVEL LT 7.0%: CPT | Mod: CPTII,S$GLB,, | Performed by: ORTHOPAEDIC SURGERY

## 2024-08-22 PROCEDURE — 99999 PR PBB SHADOW E&M-EST. PATIENT-LVL III: CPT | Mod: PBBFAC,,, | Performed by: ORTHOPAEDIC SURGERY

## 2024-08-22 NOTE — PROGRESS NOTES
EST PATIENT ORTHOPAEDIC: HIP    PRIMARY CARE PHYSICIAN: Jaswant Live MD   REFERRING PROVIDER: No referring provider defined for this encounter.     ASSESSMENT & PLAN:    Impression:  Left Hip Severe Degenerative Osteoarthritis, Primary    Follow Up Plan: 3 weeks after surgery    Shraddha Rea has radiograph and physical exam evidence of the aforementioned impression and wishes to pursue surgery. This patient appears to have sufficient symptoms to warrant surgical intervention and is an appropriate candidate for Primary Total Hip Arthroplasty as evidenced by months of unsuccessful non-operative treatment as outlined in the HPI below and progressive symptoms.    We had a lengthy discussion regarding the risk and benefit of surgery, the alternatives, limitations and personnel involved. These included but were not limited to infection, persistent pain, instability, nerve injury, blood clots, dislocation, loosening, leg length inequality and medical complications. We also discussed the pre-operative course, surgery itself and rehabilitation.    Jayna-operative blood management and transfusion issues were discussed, and options clearly outlined. The patient has consented to the use of the banked allogenic blood if medically necessary.    The patient has elected to schedule surgery at this time or intends to call the office with a surgical date. Shared decision making occurred while obtaining informed consent. The patient will be scheduled for a pre-operative education class at which time they will have their nasal swab completed and will be given CHG cloths along with the verbal and written instructions for their use.    We will plan for use of Eddi components, Richardson wright.  Tentative surgery date for 10/16/2024.  Patient requests same-day discharge.    To review:  Patient comes in with a greater than 3 year history of having left hip pain.  She has done extensive nonoperative treatments for this.  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  She is engaged in a lot a natural and holistic strategies including dry needling, acupuncture, chiropractors, supplements all which have assisted with her pain over the last few years.  She only has movement specific pain and is not having pain on a daily basis.  I advised her to continue her current symptomatic treatment strategies as they seem to be working.  Will add diclofenac to this. Weather affects her pain. She is working [employer] upcoming. No limited by weight pain in general. She has also been aided by dry needling in the past. Should these fail in the future her option would be consideration of a total hip replacement.     The patient has been ordered:  HEMANTH CT    CONSULTS:   Medicine for clearance  Anesthesia for optimization    ACTIVE PROBLEM LIST  Patient Active Problem List   Diagnosis   (none) - all problems resolved or deleted           SUBJECTIVE    CHIEF COMPLAINT: Hip Pain    HPI:   Shraddha Rea is a 73 y.o. female here for evaluation and management of left hip pain. There is not a specific incident that brought about this pain. she has had progressive problems with the hip(s) starting 2 years ago and is progressing which is now interfering with activities which include: exercise    Currently the pain in the joint is mild with activity.  The pain is intermittent and is located in groin.  The pain is described as sharp. Relieving factors include over the counter medication  and repositioning. no associated Catching, Clicking, and Popping.     They do not report leg length inequality.     Shraddha Rea has no additional complaints.     3/25/24:  Patient here for follow up of ongoing left hip pain.  Last saw about 2 years ago.  Pain is similar in nature but ongoing and wanted follow up established.    8/22/24: Patient here for follow up of ongoing left hip pain.  Pain over the  last few months this is starting to impact her quality of life.  She is unable to do functional ADLs, she has had to give up [employer] classes, unable to complete her yard work in one time.  She had tried physical therapy and dry needling appeared to make this worse.    PROGRESSIVE SYMPTOMS:  Pain limiting ability to stay fit and healthy    FUNCTIONAL STATUS:   Participate in recreational activities     PREVIOUS TREATMENTS:  Medical: Tylenol  Physical Therapy: Formal Physical Therapy for 6 weeks  Previous Orthopaedic Surgery: None    REVIEW OF SYSTEMS:  PAIN ASSESSMENT:  See HPI.  MUSCULOSKELETAL: See HPI.  OTHER 10 point review of systems is negative except as stated in HPI above    PAST MEDICAL HISTORY   has a past medical history of Personal history of colonic polyps (10/18/2021).     PAST SURGICAL HISTORY   has a past surgical history that includes Hysterectomy and Appendectomy.     FAMILY HISTORY  family history includes Breast cancer (age of onset: 60) in her mother; Cancer in her maternal grandmother; Lung cancer in her sister; Lupus in her sister.     SOCIAL HISTORY   reports that she has never smoked. She has never been exposed to tobacco smoke. She has never used smokeless tobacco. She reports that she does not currently use alcohol. She reports that she does not use drugs.     ALLERGIES   Review of patient's allergies indicates:   Allergen Reactions    Mold Shortness Of Breath    Aspirin     Grass pollen-june grass standard Itching    Sulfa (sulfonamide antibiotics)     Sulfamethoxazole Other (See Comments)    Milk containing products (dairy) Nausea And Vomiting and Other (See Comments)        MEDICATIONS   Current Outpatient Medications on File Prior to Visit   Medication Sig Dispense Refill    cetirizine (ZYRTEC) 10 MG tablet Take 10 mg by mouth daily as needed for Allergies.      diclofenac (VOLTAREN) 75 MG EC tablet Take 1 tablet (75 mg total) by mouth 2 (two) times daily. 60 tablet 1    fluticasone  "propionate (FLONASE) 50 mcg/actuation nasal spray 1 spray by Each Nostril route as needed for Rhinitis.       No current facility-administered medications on file prior to visit.          PHYSICAL EXAM   height is 5' 4" (1.626 m) and weight is 61.9 kg (136 lb 7.4 oz).   Body mass index is 23.42 kg/m².      All other systems deferred.  GENERAL:  No acute distress  HABITUS: Normal  GAIT: Non-antalgic  SKIN: Normal     HIP EXAM:    left:   ROM:   Flexion: 120 degrees    Internal Rotation: 30 degrees    External Rotation: 30 degrees    Abduction: 45 degrees    Adduction: 20 degrees  No apparent leg length discrepancy    Palpation: no tenderness over greater trochanter, SI joint, ilioposas, IT band, gluteal musculature   Pain is reproduced with IR of the hip  Strength: 5/5 hip flexion, abduction, knee flexion and extension   Straight leg raise: Negative   Neurovascular Status: Sensation intact to light touch in Sural, saphenous, SPN, DPN, Tibial nerve distribution  2+ pulse DP/PT, normal capillary refill, foot has normal warmth    DATA:  Diagnostic tests reviewed for today's visit:     AP pelvis, hip, and lateral radiographs shows narrowing of the medial joint space with sclerosis and osteophyte formation. The femoral neck is in varus position. The femoral head is spherical at superior margin. There is no signficant evidence of acetabular dysplasia and the femoral head remains covered.        "

## 2024-08-23 ENCOUNTER — PATIENT MESSAGE (OUTPATIENT)
Dept: FAMILY MEDICINE | Facility: CLINIC | Age: 74
End: 2024-08-23
Payer: MEDICARE

## 2024-08-23 DIAGNOSIS — M16.12 PRIMARY OSTEOARTHRITIS OF LEFT HIP: Primary | ICD-10-CM

## 2024-08-23 NOTE — TELEPHONE ENCOUNTER
Spoke with Dr. Melendez - he is okay with having patient cleared on 9/3/24 for her 10/16/24 surgery

## 2024-09-03 ENCOUNTER — OFFICE VISIT (OUTPATIENT)
Dept: FAMILY MEDICINE | Facility: CLINIC | Age: 74
End: 2024-09-03
Payer: MEDICARE

## 2024-09-03 VITALS
DIASTOLIC BLOOD PRESSURE: 86 MMHG | TEMPERATURE: 99 F | BODY MASS INDEX: 23.93 KG/M2 | OXYGEN SATURATION: 97 % | HEART RATE: 70 BPM | WEIGHT: 140.19 LBS | HEIGHT: 64 IN | SYSTOLIC BLOOD PRESSURE: 140 MMHG

## 2024-09-03 DIAGNOSIS — Z01.818 PREOP EXAMINATION: Primary | ICD-10-CM

## 2024-09-03 DIAGNOSIS — Z00.00 HEALTHCARE MAINTENANCE: ICD-10-CM

## 2024-09-03 DIAGNOSIS — Z12.31 BREAST CANCER SCREENING BY MAMMOGRAM: ICD-10-CM

## 2024-09-03 LAB
OHS QRS DURATION: 90 MS
OHS QTC CALCULATION: 430 MS

## 2024-09-03 PROCEDURE — 1160F RVW MEDS BY RX/DR IN RCRD: CPT | Mod: CPTII,S$GLB,, | Performed by: INTERNAL MEDICINE

## 2024-09-03 PROCEDURE — 1125F AMNT PAIN NOTED PAIN PRSNT: CPT | Mod: CPTII,S$GLB,, | Performed by: INTERNAL MEDICINE

## 2024-09-03 PROCEDURE — 3008F BODY MASS INDEX DOCD: CPT | Mod: CPTII,S$GLB,, | Performed by: INTERNAL MEDICINE

## 2024-09-03 PROCEDURE — 3044F HG A1C LEVEL LT 7.0%: CPT | Mod: CPTII,S$GLB,, | Performed by: INTERNAL MEDICINE

## 2024-09-03 PROCEDURE — 99999 PR PBB SHADOW E&M-EST. PATIENT-LVL IV: CPT | Mod: PBBFAC,,, | Performed by: INTERNAL MEDICINE

## 2024-09-03 PROCEDURE — 3079F DIAST BP 80-89 MM HG: CPT | Mod: CPTII,S$GLB,, | Performed by: INTERNAL MEDICINE

## 2024-09-03 PROCEDURE — 93010 ELECTROCARDIOGRAM REPORT: CPT | Mod: S$GLB,,, | Performed by: INTERNAL MEDICINE

## 2024-09-03 PROCEDURE — 1159F MED LIST DOCD IN RCRD: CPT | Mod: CPTII,S$GLB,, | Performed by: INTERNAL MEDICINE

## 2024-09-03 PROCEDURE — 3288F FALL RISK ASSESSMENT DOCD: CPT | Mod: CPTII,S$GLB,, | Performed by: INTERNAL MEDICINE

## 2024-09-03 PROCEDURE — 1101F PT FALLS ASSESS-DOCD LE1/YR: CPT | Mod: CPTII,S$GLB,, | Performed by: INTERNAL MEDICINE

## 2024-09-03 PROCEDURE — 99214 OFFICE O/P EST MOD 30 MIN: CPT | Mod: 25,S$GLB,, | Performed by: INTERNAL MEDICINE

## 2024-09-03 PROCEDURE — 93005 ELECTROCARDIOGRAM TRACING: CPT | Mod: S$GLB,,, | Performed by: INTERNAL MEDICINE

## 2024-09-03 PROCEDURE — G0009 ADMIN PNEUMOCOCCAL VACCINE: HCPCS | Mod: S$GLB,,, | Performed by: INTERNAL MEDICINE

## 2024-09-03 PROCEDURE — 3077F SYST BP >= 140 MM HG: CPT | Mod: CPTII,S$GLB,, | Performed by: INTERNAL MEDICINE

## 2024-09-03 PROCEDURE — 90677 PCV20 VACCINE IM: CPT | Mod: S$GLB,,, | Performed by: INTERNAL MEDICINE

## 2024-09-03 NOTE — PROGRESS NOTES
Verified patient's name, , and allergies. Patient given PCV20 vaccine to left deltoid, no complaints or reactions noted. Vis given 21 to patient. Instructed to wait in clinic for 15 minutes to note for reactions, verbalized understanding.

## 2024-09-03 NOTE — PROGRESS NOTES
HISTORY OF PRESENT ILLNESS:  Shraddha Rea is a 73 y.o. female who presents to the clinic today for Follow-up (6m f/u. Medical clearance for hip replacement 10/16/2024.)    ROBER Dr. Melendez 10/16/24.    HOME /72.  Notes she has white coat HTN and home reading are typically 130s/70s.    Chest pain at rest: No  Chest pain with exertion: No    Shortness of Breath at rest:No  Dyspnea on exertion: No    Exercise Capacity: The patient is able to walk more than one block without shortness of breath.  The patient is able to exercise for greater than 4 METS.      PAST MEDICAL HISTORY:  Past Medical History:   Diagnosis Date    Personal history of colonic polyps 10/18/2021    2 mm ployp in the cecum,2 mm polyp in the descending colon       PAST SURGICAL HISTORY:  Past Surgical History:   Procedure Laterality Date    APPENDECTOMY      HYSTERECTOMY         SOCIAL HISTORY:  Social History     Socioeconomic History    Marital status:     Number of children: 2   Tobacco Use    Smoking status: Never     Passive exposure: Never    Smokeless tobacco: Never   Substance and Sexual Activity    Alcohol use: Not Currently     Comment: occasional social    Drug use: Never    Sexual activity: Not Currently     Social Determinants of Health     Financial Resource Strain: Low Risk  (7/10/2024)    Overall Financial Resource Strain (CARDIA)     Difficulty of Paying Living Expenses: Not hard at all   Food Insecurity: No Food Insecurity (7/10/2024)    Hunger Vital Sign     Worried About Running Out of Food in the Last Year: Never true     Ran Out of Food in the Last Year: Never true   Transportation Needs: No Transportation Needs (7/10/2024)    PRAPARE - Transportation     Lack of Transportation (Medical): No     Lack of Transportation (Non-Medical): No   Physical Activity: Insufficiently Active (7/10/2024)    Exercise Vital Sign     Days of Exercise per Week: 2 days     Minutes of Exercise per Session: 60 min   Stress: No  Stress Concern Present (7/10/2024)    [employer] - Occupational Stress Questionnaire     Feeling of Stress : Not at all   Housing Stability: Low Risk  (7/10/2024)    Housing Stability Vital Sign     Unable to Pay for Housing in the Last Year: No     Homeless in the Last Year: No       FAMILY HISTORY:  Family History   Problem Relation Name Age of Onset    Breast cancer Mother  60    Lung cancer Sister      Lupus Sister      Cancer Maternal Grandmother         ALLERGIES AND MEDICATIONS: updated and reviewed.  Review of patient's allergies indicates:   Allergen Reactions    Mold Shortness Of Breath    Aspirin     Grass pollen-kiran grass standard Itching    Sulfa (sulfonamide antibiotics)     Sulfamethoxazole Other (See Comments)    Milk containing products (dairy) Nausea And Vomiting and Other (See Comments)     Medication List with Changes/Refills   Current Medications    CETIRIZINE (ZYRTEC) 10 MG TABLET    Take 10 mg by mouth daily as needed for Allergies.    DICLOFENAC (VOLTAREN) 75 MG EC TABLET    Take 1 tablet (75 mg total) by mouth 2 (two) times daily.    FLUTICASONE PROPIONATE (FLONASE) 50 MCG/ACTUATION NASAL SPRAY    1 spray by Each Nostril route as needed for Rhinitis.          CARE TEAM:  Patient Care Team:  Jaswant Live MD as PCP - General (Internal Medicine)  [facility]  [facility] (Gastroenterology)         REVIEW OF SYSTEMS:  Review of Systems   Constitutional:  Negative for activity change and unexpected weight change.   HENT:  Negative for hearing loss, rhinorrhea and trouble swallowing.    Eyes:  Negative for discharge and visual disturbance.   Respiratory:  Negative for chest tightness and wheezing.    Cardiovascular:  Negative for chest pain and palpitations.   Gastrointestinal:  Negative for blood in stool, constipation, diarrhea and vomiting.   Endocrine: Negative for polydipsia and polyuria.    Genitourinary:  Negative for difficulty urinating, dysuria, hematuria and menstrual problem.   Musculoskeletal:  Positive for arthralgias. Negative for joint swelling and neck pain.   Neurological:  Negative for weakness and headaches.   Psychiatric/Behavioral:  Negative for confusion and dysphoric mood.          PHYSICAL EXAM:   Vitals:    09/03/24 1155   BP: (!) 140/86   Pulse:    Temp:              Body mass index is 24.07 kg/m².     General appearance - alert, well appearing, and in no distress  Mental status - alert, oriented to person, place, and time  Eyes - pupils equal and reactive, extraocular eye movements intact  Chest - clear to auscultation, no wheezes, rales or rhonchi, symmetric air entry  Heart - normal rate, regular rhythm, normal S1, S2, no murmurs, rubs, clicks or gallops  Neurological - alert, oriented, normal speech, no focal findings or movement disorder noted  Extremities - peripheral pulses normal, no pedal edema, no clubbing or cyanosis      ASSESSMENT AND PLAN:  Preop examination  -     EKG 12-lead - NSR, no Q wave or TWI  -     Comprehensive Metabolic Panel; Future; Expected date: 09/03/2024  -  Cardiac Status: No apparent clinical predictors for cardiovascular complications in noncardiac surgery. (Hx of Ischemic Heart Disease, compensated CHF, renal insufficiency with serum creatinine > 2.0, diabetes requiring insulin, or cerebrovascular disease.)  - No further cardiac workup indicated at this time.  Patient is at Low Risk for a Low Risk surgery from the cardiac standpoint.  Okay to proceed with planned surgery at this time.  - Noted h/o white coat HTN.  Patient to follow up nurse visit and keep daily home BP log as well.  Reported prior home readings 130s/70s.    Breast cancer screening by mammogram  -     Mammo Digital Screening Bilat w/ Mesfin; Future; Expected date: 11/11/2024    Healthcare maintenance  -     pneumoc 20-kyle conj-dip cr(PF) (PREVNAR-20 (PF)) injection Syrg 0.5 mL           Follow up Jan 2024 or sooner as needed.

## 2024-09-04 ENCOUNTER — PATIENT MESSAGE (OUTPATIENT)
Dept: FAMILY MEDICINE | Facility: CLINIC | Age: 74
End: 2024-09-04
Payer: MEDICARE

## 2024-09-04 ENCOUNTER — LAB VISIT (OUTPATIENT)
Dept: LAB | Facility: HOSPITAL | Age: 74
End: 2024-09-04
Attending: INTERNAL MEDICINE
Payer: MEDICARE

## 2024-09-04 VITALS — DIASTOLIC BLOOD PRESSURE: 68 MMHG | SYSTOLIC BLOOD PRESSURE: 135 MMHG

## 2024-09-04 DIAGNOSIS — Z01.818 PREOP EXAMINATION: ICD-10-CM

## 2024-09-04 LAB
ALBUMIN SERPL BCP-MCNC: 3.6 G/DL (ref 3.5–5.2)
ALP SERPL-CCNC: 98 U/L (ref 55–135)
ALT SERPL W/O P-5'-P-CCNC: 18 U/L (ref 10–44)
ANION GAP SERPL CALC-SCNC: 12 MMOL/L (ref 8–16)
AST SERPL-CCNC: 28 U/L (ref 10–40)
BILIRUB SERPL-MCNC: 0.5 MG/DL (ref 0.1–1)
BUN SERPL-MCNC: 12 MG/DL (ref 8–23)
CALCIUM SERPL-MCNC: 9.4 MG/DL (ref 8.7–10.5)
CHLORIDE SERPL-SCNC: 106 MMOL/L (ref 95–110)
CO2 SERPL-SCNC: 24 MMOL/L (ref 23–29)
CREAT SERPL-MCNC: 0.9 MG/DL (ref 0.5–1.4)
EST. GFR  (NO RACE VARIABLE): >60 ML/MIN/1.73 M^2
GLUCOSE SERPL-MCNC: 132 MG/DL (ref 70–110)
POTASSIUM SERPL-SCNC: 3.7 MMOL/L (ref 3.5–5.1)
PROT SERPL-MCNC: 6.7 G/DL (ref 6–8.4)
SODIUM SERPL-SCNC: 142 MMOL/L (ref 136–145)

## 2024-09-04 PROCEDURE — 80053 COMPREHEN METABOLIC PANEL: CPT | Performed by: INTERNAL MEDICINE

## 2024-09-04 PROCEDURE — 36415 COLL VENOUS BLD VENIPUNCTURE: CPT | Mod: PN | Performed by: INTERNAL MEDICINE

## 2024-09-06 ENCOUNTER — ANESTHESIA EVENT (OUTPATIENT)
Dept: SURGERY | Facility: HOSPITAL | Age: 74
End: 2024-09-06
Payer: MEDICARE

## 2024-09-09 ENCOUNTER — PATIENT MESSAGE (OUTPATIENT)
Dept: ORTHOPEDICS | Facility: CLINIC | Age: 74
End: 2024-09-09
Payer: MEDICARE

## 2024-09-16 ENCOUNTER — TELEPHONE (OUTPATIENT)
Dept: PREADMISSION TESTING | Facility: HOSPITAL | Age: 74
End: 2024-09-16
Payer: MEDICARE

## 2024-09-18 ENCOUNTER — TELEPHONE (OUTPATIENT)
Dept: PREADMISSION TESTING | Facility: HOSPITAL | Age: 74
End: 2024-09-18
Payer: MEDICARE

## 2024-09-20 ENCOUNTER — TELEPHONE (OUTPATIENT)
Dept: PREADMISSION TESTING | Facility: HOSPITAL | Age: 74
End: 2024-09-20
Payer: MEDICARE

## 2024-09-25 ENCOUNTER — CLINICAL SUPPORT (OUTPATIENT)
Dept: FAMILY MEDICINE | Facility: CLINIC | Age: 74
End: 2024-09-25
Payer: MEDICARE

## 2024-09-25 VITALS — DIASTOLIC BLOOD PRESSURE: 82 MMHG | HEART RATE: 88 BPM | SYSTOLIC BLOOD PRESSURE: 136 MMHG | OXYGEN SATURATION: 96 %

## 2024-09-25 DIAGNOSIS — Z01.30 BLOOD PRESSURE CHECK: Primary | ICD-10-CM

## 2024-09-25 PROCEDURE — 99999 PR PBB SHADOW E&M-EST. PATIENT-LVL II: CPT | Mod: PBBFAC,,,

## 2024-09-25 NOTE — PROGRESS NOTES
Shraddha Rea 73 y.o. female is here today for Blood Pressure check.   History of HTN no.    Review of patient's allergies indicates:   Allergen Reactions    Mold Shortness Of Breath    Aspirin     Grass pollen-june grass standard Itching    Sulfa (sulfonamide antibiotics)     Sulfamethoxazole Other (See Comments)    Milk containing products (dairy) Nausea And Vomiting and Other (See Comments)     Creatinine   Date Value Ref Range Status   09/04/2024 0.9 0.5 - 1.4 mg/dL Final     Sodium   Date Value Ref Range Status   09/04/2024 142 136 - 145 mmol/L Final     Potassium   Date Value Ref Range Status   09/04/2024 3.7 3.5 - 5.1 mmol/L Final   ]  Patient denies taking blood pressure medications on a regular basis at the same time of the day.     Current Outpatient Medications:     cetirizine (ZYRTEC) 10 MG tablet, Take 10 mg by mouth daily as needed for Allergies., Disp: , Rfl:     diclofenac (VOLTAREN) 75 MG EC tablet, Take 1 tablet (75 mg total) by mouth 2 (two) times daily., Disp: 60 tablet, Rfl: 1    fluticasone propionate (FLONASE) 50 mcg/actuation nasal spray, 1 spray by Each Nostril route as needed for Rhinitis., Disp: , Rfl:   Does patient have record of home blood pressure readings no.   Patient is asymptomatic.   Complains none.    BP: 136/82 , Pulse: 88 .    Jaswant Sims MD will be notified.

## 2024-09-27 ENCOUNTER — HOSPITAL ENCOUNTER (OUTPATIENT)
Dept: RADIOLOGY | Facility: HOSPITAL | Age: 74
Discharge: HOME | End: 2024-09-27
Attending: ORTHOPAEDIC SURGERY
Payer: MEDICARE

## 2024-09-27 DIAGNOSIS — M16.12 PRIMARY OSTEOARTHRITIS OF LEFT HIP: ICD-10-CM

## 2024-09-27 PROCEDURE — 73700 CT LOWER EXTREMITY W/O DYE: CPT | Mod: 26,LT,, | Performed by: RADIOLOGY

## 2024-09-27 PROCEDURE — 73700 CT LOWER EXTREMITY W/O DYE: CPT | Mod: TC,LT

## 2024-10-02 ENCOUNTER — HOSPITAL ENCOUNTER (OUTPATIENT)
Dept: PREADMISSION TESTING | Facility: HOSPITAL | Age: 74
Discharge: HOME | End: 2024-10-02
Attending: ORTHOPAEDIC SURGERY
Payer: MEDICARE

## 2024-10-02 ENCOUNTER — HOSPITAL ENCOUNTER (OUTPATIENT)
Dept: RADIOLOGY | Facility: HOSPITAL | Age: 74
Discharge: HOME | End: 2024-10-02
Attending: ORTHOPAEDIC SURGERY
Payer: MEDICARE

## 2024-10-02 VITALS
SYSTOLIC BLOOD PRESSURE: 137 MMHG | OXYGEN SATURATION: 98 % | RESPIRATION RATE: 18 BRPM | DIASTOLIC BLOOD PRESSURE: 72 MMHG | WEIGHT: 138.88 LBS | BODY MASS INDEX: 23.84 KG/M2

## 2024-10-02 DIAGNOSIS — M25.59 PAIN IN OTHER SPECIFIED JOINT: ICD-10-CM

## 2024-10-02 DIAGNOSIS — M25.69 STIFFNESS OF OTHER SPECIFIED JOINT, NOT ELSEWHERE CLASSIFIED: ICD-10-CM

## 2024-10-02 DIAGNOSIS — Z01.818 PREOP TESTING: Primary | ICD-10-CM

## 2024-10-02 LAB
ALBUMIN SERPL BCP-MCNC: 3.6 G/DL (ref 3.5–5.2)
ALP SERPL-CCNC: 97 U/L (ref 55–135)
ALT SERPL W/O P-5'-P-CCNC: 15 U/L (ref 10–44)
ANION GAP SERPL CALC-SCNC: 9 MMOL/L (ref 8–16)
APTT PPP: 27.6 SEC (ref 21–32)
AST SERPL-CCNC: 22 U/L (ref 10–40)
BACTERIA #/AREA URNS HPF: NORMAL /HPF
BASOPHILS # BLD AUTO: 0.03 K/UL (ref 0–0.2)
BASOPHILS NFR BLD: 0.6 % (ref 0–1.9)
BILIRUB SERPL-MCNC: 0.5 MG/DL (ref 0.1–1)
BILIRUB UR QL STRIP: NEGATIVE
BUN SERPL-MCNC: 10 MG/DL (ref 8–23)
CALCIUM SERPL-MCNC: 9.6 MG/DL (ref 8.7–10.5)
CHLORIDE SERPL-SCNC: 105 MMOL/L (ref 95–110)
CLARITY UR: CLEAR
CO2 SERPL-SCNC: 27 MMOL/L (ref 23–29)
COLOR UR: YELLOW
CREAT SERPL-MCNC: 0.8 MG/DL (ref 0.5–1.4)
DIFFERENTIAL METHOD BLD: ABNORMAL
EOSINOPHIL # BLD AUTO: 0 K/UL (ref 0–0.5)
EOSINOPHIL NFR BLD: 0.8 % (ref 0–8)
ERYTHROCYTE [DISTWIDTH] IN BLOOD BY AUTOMATED COUNT: 13.2 % (ref 11.5–14.5)
EST. GFR  (NO RACE VARIABLE): >60 ML/MIN/1.73 M^2
GLUCOSE SERPL-MCNC: 73 MG/DL (ref 70–110)
GLUCOSE UR QL STRIP: NEGATIVE
HCT VFR BLD AUTO: 39.4 % (ref 37–48.5)
HGB BLD-MCNC: 12.9 G/DL (ref 12–16)
HGB UR QL STRIP: NEGATIVE
HYALINE CASTS #/AREA URNS LPF: 0 /LPF
IMM GRANULOCYTES # BLD AUTO: 0.01 K/UL (ref 0–0.04)
IMM GRANULOCYTES NFR BLD AUTO: 0.2 % (ref 0–0.5)
INR PPP: 1 (ref 0.8–1.2)
KETONES UR QL STRIP: NEGATIVE
LEUKOCYTE ESTERASE UR QL STRIP: NEGATIVE
LYMPHOCYTES # BLD AUTO: 1.1 K/UL (ref 1–4.8)
LYMPHOCYTES NFR BLD: 22.9 % (ref 18–48)
MCH RBC QN AUTO: 31.3 PG (ref 27–31)
MCHC RBC AUTO-ENTMCNC: 32.7 G/DL (ref 32–36)
MCV RBC AUTO: 96 FL (ref 82–98)
MICROSCOPIC COMMENT: NORMAL
MONOCYTES # BLD AUTO: 0.4 K/UL (ref 0.3–1)
MONOCYTES NFR BLD: 7.9 % (ref 4–15)
NEUTROPHILS # BLD AUTO: 3.3 K/UL (ref 1.8–7.7)
NEUTROPHILS NFR BLD: 67.6 % (ref 38–73)
NITRITE UR QL STRIP: NEGATIVE
NRBC BLD-RTO: 0 /100 WBC
PH UR STRIP: 6 [PH] (ref 5–8)
PLATELET # BLD AUTO: 293 K/UL (ref 150–450)
PMV BLD AUTO: 10.2 FL (ref 9.2–12.9)
POTASSIUM SERPL-SCNC: 4.2 MMOL/L (ref 3.5–5.1)
PROT SERPL-MCNC: 7 G/DL (ref 6–8.4)
PROT UR QL STRIP: ABNORMAL
PROTHROMBIN TIME: 10.7 SEC (ref 9–12.5)
RBC # BLD AUTO: 4.12 M/UL (ref 4–5.4)
RBC #/AREA URNS HPF: 3 /HPF (ref 0–4)
SODIUM SERPL-SCNC: 141 MMOL/L (ref 136–145)
SP GR UR STRIP: 1.01 (ref 1–1.03)
URN SPEC COLLECT METH UR: ABNORMAL
UROBILINOGEN UR STRIP-ACNC: NEGATIVE EU/DL
WBC # BLD AUTO: 4.94 K/UL (ref 3.9–12.7)
WBC #/AREA URNS HPF: 0 /HPF (ref 0–5)

## 2024-10-02 PROCEDURE — 81000 URINALYSIS NONAUTO W/SCOPE: CPT | Performed by: ORTHOPAEDIC SURGERY

## 2024-10-02 PROCEDURE — 85730 THROMBOPLASTIN TIME PARTIAL: CPT | Performed by: ORTHOPAEDIC SURGERY

## 2024-10-02 PROCEDURE — 80053 COMPREHEN METABOLIC PANEL: CPT | Performed by: ORTHOPAEDIC SURGERY

## 2024-10-02 PROCEDURE — 85610 PROTHROMBIN TIME: CPT | Performed by: ORTHOPAEDIC SURGERY

## 2024-10-02 PROCEDURE — 36415 COLL VENOUS BLD VENIPUNCTURE: CPT | Performed by: ORTHOPAEDIC SURGERY

## 2024-10-02 PROCEDURE — 85025 COMPLETE CBC W/AUTO DIFF WBC: CPT | Performed by: ORTHOPAEDIC SURGERY

## 2024-10-02 PROCEDURE — 71046 X-RAY EXAM CHEST 2 VIEWS: CPT | Mod: 26,,, | Performed by: RADIOLOGY

## 2024-10-02 PROCEDURE — 71046 X-RAY EXAM CHEST 2 VIEWS: CPT | Mod: TC,FY

## 2024-10-02 NOTE — ANESTHESIA PREPROCEDURE EVALUATION
10/02/2024  hSraddha Rea is a 73 y.o., female scheduled for ARTHROPLASTY, HIP, TOTAL, USING COMPUTER-ASSISTED NAVIGATION (Left: Hip) - on 10/16/2024.      Past Medical History:   Diagnosis Date    Arthritis     Personal history of colonic polyps 10/18/2021    2 mm ployp in the cecum,2 mm polyp in the descending colon         Past Surgical History:   Procedure Laterality Date    APPENDECTOMY      HYSTERECTOMY               Pre-op Assessment    I have reviewed the Patient Summary Reports.     I have reviewed the Nursing Notes. I have reviewed the NPO Status.   I have reviewed the Medications.     Review of Systems  Anesthesia Hx:  No problems with previous Anesthesia             Denies Family Hx of Anesthesia complications.    Denies Personal Hx of Anesthesia complications.                    Social:  Non-Smoker, No Alcohol Use       Hematology/Oncology:  Hematology Normal   Oncology Normal                                   EENT/Dental:  EENT/Dental Normal           Cardiovascular:  Exercise tolerance: good    Denies Hypertension.                Functional Capacity good / => 4 METS                         Pulmonary:  Pulmonary Normal                       Renal/:  Renal/ Normal                 Hepatic/GI:  Hepatic/GI Normal                 Musculoskeletal:  Arthritis   L hip            Neurological:  Neurology Normal                                      Endocrine:  Endocrine Normal            Dermatological:  Skin Normal    Psych:  Psychiatric Normal                    Physical Exam  General: Well nourished    Airway:  Mallampati: II   Mouth Opening: Normal  Tongue: Normal  Neck ROM: Normal ROM    Dental:  Intact    Chest/Lungs:  Clear to auscultation    Heart:  Rate: Normal  Rhythm: Regular Rhythm  Sounds: Normal        Anesthesia Plan  Type of Anesthesia, risks & benefits discussed:    Anesthesia  Type: Spinal  Intra-op Monitoring Plan: Standard ASA Monitors  Induction:  IV  Informed Consent: Patient consented to blood products? Yes  ASA Score: 2  Anesthesia Plan Notes: No further cardiac workup indicated at this time.  Patient is at Low Risk for a Low Risk surgery from the cardiac standpoint.  Okay to proceed with planned surgery at this time.    Ready For Surgery From Anesthesia Perspective.     .

## 2024-10-02 NOTE — DISCHARGE INSTRUCTIONS
YOUR PROCEDURE WILL BE AT [facility] at [address] [place] [address]                 Enter through the Main Entrance facing [address].                 Report to the Same Day Surgery Registration Desk in the hallway.(Just beside the Same Day Surgery Unit)      Your procedure  is scheduled for ____10/16/2024______.    Call [phone] between 2pm and 5pm on ____10/15/2024___to find out your arrival time for the day of surgery.    You may have two visitors.  No children under 12 years old.     You will be going to the Same Day Surgery Unit on the 2nd floor of the hospital.    Important instructions:  Do not eat anything after midnight.  You may have plain water, non carbonated.  You may also have Gatorade or Powerade after midnight.    Stop all fluids 2 hours before your surgery.    It is okay to brush your teeth.  Do not have gum, candy or mints.    SEE MEDICATION SHEET.   TAKE MEDICATIONS AS DIRECTED.      Do not take any diabetic medication on the morning of surgery unless instructed to do so by your doctor or pre op nurse.      All GLP-1 weekly diabetic/weight loss medications must not be taken for one week before your surgery, or your surgery could be canceled.      STOP taking Aspirin, Ibuprofen,  Advil, Motrin, Mobic(meloxicam, celebrex), Aleve (naproxen), Fish oil, and Vitamin E for at least 7 days before your surgery.     You may take Tylenol if needed which is not a blood thinner.    Please shower the night before and the morning of your surgery.      Follow any Prep Instructions given by your surgeon.    Use Chlorhexidine soap as instructed by your pre op nurse.   Please place clean linens on your bed the night before surgery. Please wear fresh clean clothing after each shower.    No shaving of procedural area at least 4-5 days before surgery due to increased risk of skin irritation and/or possible infection.    Female patients may be asked for a urine specimen  on the morning of the surgery.  Please check with your nurse before using the restroom.    Contact lenses and removable denture work may not be worn during your procedure.    You may wear deodorant only. If you are having breast surgery, do not wear deodorant on the operative side.    Do not wear powder, body lotion, perfume/cologne or make-up, oils, creams or rubs.    Do not wear any jewelry or have any metal on your body.    You will be asked to remove any dentures or partials for the procedure.    If you are going home on the same day of surgery, you must arrange for a family member or a friend to drive you home.  Public transportation is prohibited.  You will not be able to drive home if you were given anesthesia or sedation.    Patients who want to have their Post-op prescriptions filled from our in-house [facility], bring a Credit/Debit Card or cash with you. A co-pay may be required.  The pharmacy closes at 5:30 pm.    Wear loose fitting clothes allowing for bandages.    Please leave money and valuables home.      You may bring your cell phone.    Call the doctor if fever or illness should occur before your surgery.    Call [phone] to contact us here if needed.                            CLOTHES ON DAY OF SURGERY    SHOULDER surgery:  you must have a very oversized shirt.  Very, Very large.  You will probably have a large sling on with your arm strapped to your chest.  You will not be able to put the arm of the operated shoulder into a sleeve.  You can put the arm of the un-operated shoulder into the sleeve, but the shirt will need to be draped over the operated shoulder.       ARM or HAND surgery:  make sure that your sleeves are large and loose enough to pass over large dressings or cast.      BREAST or UNDERARM surgery:  wear a loose, button down shirt so that you can dress without raising your arms over your head.    ABDOMINAL surgery:  wear loose, comfortable clothing.  Nothing tight around the  abdomen.  NO JEANS    PENIS or SCROTAL surgery:  loose comfortable clothing.  Large sweat pants, pajama pants or a robe.  ABSOLUTELY NO JEANS      LEG or FOOT surgery:  wear large loose pants that are able to pass over any large dressings or casts.  You could also wear loose shorts or a skirt.

## 2024-10-03 ENCOUNTER — IMMUNIZATION (OUTPATIENT)
Dept: INTERNAL MEDICINE | Facility: CLINIC | Age: 74
End: 2024-10-03
Payer: MEDICARE

## 2024-10-03 DIAGNOSIS — Z23 NEED FOR VACCINATION: Primary | ICD-10-CM

## 2024-10-03 PROCEDURE — 90480 ADMN SARSCOV2 VAC 1/ONLY CMP: CPT | Mod: S$GLB,,, | Performed by: INTERNAL MEDICINE

## 2024-10-03 PROCEDURE — 91320 SARSCV2 VAC 30MCG TRS-SUC IM: CPT | Mod: S$GLB,,, | Performed by: INTERNAL MEDICINE

## 2024-10-08 DIAGNOSIS — M16.12 PRIMARY OSTEOARTHRITIS OF LEFT HIP: Primary | ICD-10-CM

## 2024-10-09 ENCOUNTER — HOSPITAL ENCOUNTER (OUTPATIENT)
Dept: RADIOLOGY | Facility: HOSPITAL | Age: 74
Discharge: HOME | End: 2024-10-09
Attending: ORTHOPAEDIC SURGERY

## 2024-10-09 DIAGNOSIS — M16.12 PRIMARY OSTEOARTHRITIS OF LEFT HIP: ICD-10-CM

## 2024-10-09 PROCEDURE — 73700 CT LOWER EXTREMITY W/O DYE: CPT | Mod: TC,LT

## 2024-10-14 ENCOUNTER — LAB VISIT (OUTPATIENT)
Dept: LAB | Facility: HOSPITAL | Age: 74
End: 2024-10-14
Attending: ORTHOPAEDIC SURGERY
Payer: MEDICARE

## 2024-10-14 DIAGNOSIS — Z01.818 PREOP TESTING: ICD-10-CM

## 2024-10-14 LAB
ABO + RH BLD: NORMAL
BLD GP AB SCN CELLS X3 SERPL QL: NORMAL
SPECIMEN OUTDATE: NORMAL

## 2024-10-14 PROCEDURE — 86850 RBC ANTIBODY SCREEN: CPT | Performed by: ORTHOPAEDIC SURGERY

## 2024-10-14 PROCEDURE — 86901 BLOOD TYPING SEROLOGIC RH(D): CPT | Performed by: ORTHOPAEDIC SURGERY

## 2024-10-14 PROCEDURE — 86900 BLOOD TYPING SEROLOGIC ABO: CPT | Performed by: ORTHOPAEDIC SURGERY

## 2024-10-14 PROCEDURE — 36415 COLL VENOUS BLD VENIPUNCTURE: CPT | Performed by: ORTHOPAEDIC SURGERY

## 2024-10-15 ENCOUNTER — TELEPHONE (OUTPATIENT)
Dept: SURGERY | Facility: HOSPITAL | Age: 74
End: 2024-10-15
Payer: MEDICARE

## 2024-10-16 ENCOUNTER — HOSPITAL ENCOUNTER (OUTPATIENT)
Facility: HOSPITAL | Age: 74
Discharge: HOME HEALTH | End: 2024-10-17
Attending: ORTHOPAEDIC SURGERY | Admitting: ORTHOPAEDIC SURGERY
Payer: MEDICARE

## 2024-10-16 ENCOUNTER — ANESTHESIA (OUTPATIENT)
Dept: SURGERY | Facility: HOSPITAL | Age: 74
End: 2024-10-16
Payer: MEDICARE

## 2024-10-16 DIAGNOSIS — M16.12 PRIMARY OSTEOARTHRITIS OF LEFT HIP: Primary | ICD-10-CM

## 2024-10-16 DIAGNOSIS — M16.12 ARTHRITIS OF LEFT HIP: ICD-10-CM

## 2024-10-16 PROCEDURE — 25000003 PHARM REV CODE 250: Performed by: ORTHOPAEDIC SURGERY

## 2024-10-16 PROCEDURE — 25000003 PHARM REV CODE 250: Performed by: ANESTHESIOLOGY

## 2024-10-16 PROCEDURE — 71000039 HC RECOVERY, EACH ADD'L HOUR: Performed by: ORTHOPAEDIC SURGERY

## 2024-10-16 PROCEDURE — 63600175 PHARM REV CODE 636 W HCPCS: Performed by: ORTHOPAEDIC SURGERY

## 2024-10-16 PROCEDURE — 27201423 OPTIME MED/SURG SUP & DEVICES STERILE SUPPLY: Performed by: ORTHOPAEDIC SURGERY

## 2024-10-16 PROCEDURE — 37000008 HC ANESTHESIA 1ST 15 MINUTES: Performed by: ORTHOPAEDIC SURGERY

## 2024-10-16 PROCEDURE — 63600175 PHARM REV CODE 636 W HCPCS: Performed by: ANESTHESIOLOGY

## 2024-10-16 PROCEDURE — 71000033 HC RECOVERY, INTIAL HOUR: Performed by: ORTHOPAEDIC SURGERY

## 2024-10-16 PROCEDURE — 63600175 PHARM REV CODE 636 W HCPCS: Performed by: NURSE ANESTHETIST, CERTIFIED REGISTERED

## 2024-10-16 PROCEDURE — 25000003 PHARM REV CODE 250: Performed by: NURSE ANESTHETIST, CERTIFIED REGISTERED

## 2024-10-16 PROCEDURE — 0055T BONE SRGRY CMPTR CT/MRI IMAG: CPT | Mod: ,,, | Performed by: ORTHOPAEDIC SURGERY

## 2024-10-16 PROCEDURE — 36000712 HC OR TIME LEV V 1ST 15 MIN: Performed by: ORTHOPAEDIC SURGERY

## 2024-10-16 PROCEDURE — 36000713 HC OR TIME LEV V EA ADD 15 MIN: Performed by: ORTHOPAEDIC SURGERY

## 2024-10-16 PROCEDURE — 97165 OT EVAL LOW COMPLEX 30 MIN: CPT

## 2024-10-16 PROCEDURE — 37000009 HC ANESTHESIA EA ADD 15 MINS: Performed by: ORTHOPAEDIC SURGERY

## 2024-10-16 PROCEDURE — 27130 TOTAL HIP ARTHROPLASTY: CPT | Mod: LT,,, | Performed by: ORTHOPAEDIC SURGERY

## 2024-10-16 PROCEDURE — 97530 THERAPEUTIC ACTIVITIES: CPT

## 2024-10-16 PROCEDURE — C1776 JOINT DEVICE (IMPLANTABLE): HCPCS | Performed by: ORTHOPAEDIC SURGERY

## 2024-10-16 PROCEDURE — 97161 PT EVAL LOW COMPLEX 20 MIN: CPT

## 2024-10-16 PROCEDURE — 97535 SELF CARE MNGMENT TRAINING: CPT

## 2024-10-16 DEVICE — LINER- CEMENTLESS
Type: IMPLANTABLE DEVICE | Site: HIP | Status: FUNCTIONAL
Brand: MDM

## 2024-10-16 DEVICE — RESTORATION ADM/MDM X3 INSERT
Type: IMPLANTABLE DEVICE | Site: HIP | Status: FUNCTIONAL
Brand: RESTORATION ADM/MDM X3 INSERT

## 2024-10-16 DEVICE — CERAMIC V40 FEMORAL HEAD
Type: IMPLANTABLE DEVICE | Site: HIP | Status: FUNCTIONAL
Brand: BIOLOX

## 2024-10-16 DEVICE — 127 DEGREE NECK ANGLE HIP STEM
Type: IMPLANTABLE DEVICE | Site: HIP | Status: FUNCTIONAL
Brand: ACCOLADE

## 2024-10-16 RX ORDER — METHOCARBAMOL 750 MG/1
750 TABLET, FILM COATED ORAL 3 TIMES DAILY
Status: DISCONTINUED | OUTPATIENT
Start: 2024-10-16 | End: 2024-10-17 | Stop reason: HOSPADM

## 2024-10-16 RX ORDER — LIDOCAINE HYDROCHLORIDE 10 MG/ML
1 INJECTION, SOLUTION EPIDURAL; INFILTRATION; INTRACAUDAL; PERINEURAL ONCE
Status: DISCONTINUED | OUTPATIENT
Start: 2024-10-16 | End: 2024-10-16 | Stop reason: HOSPADM

## 2024-10-16 RX ORDER — ONDANSETRON HYDROCHLORIDE 2 MG/ML
INJECTION, SOLUTION INTRAVENOUS
Status: DISCONTINUED | OUTPATIENT
Start: 2024-10-16 | End: 2024-10-16

## 2024-10-16 RX ORDER — TRANEXAMIC ACID 10 MG/ML
1000 INJECTION, SOLUTION INTRAVENOUS
Status: COMPLETED | OUTPATIENT
Start: 2024-10-16 | End: 2024-10-16

## 2024-10-16 RX ORDER — DICLOFENAC SODIUM 30 MG/G
GEL TOPICAL 2 TIMES DAILY
Status: ON HOLD | COMMUNITY
End: 2024-10-16 | Stop reason: HOSPADM

## 2024-10-16 RX ORDER — ACETAMINOPHEN 500 MG
500 TABLET ORAL EVERY 6 HOURS PRN
Status: ON HOLD | COMMUNITY
End: 2024-10-16 | Stop reason: HOSPADM

## 2024-10-16 RX ORDER — AMOXICILLIN 250 MG
1 CAPSULE ORAL 2 TIMES DAILY
Status: DISCONTINUED | OUTPATIENT
Start: 2024-10-16 | End: 2024-10-17 | Stop reason: HOSPADM

## 2024-10-16 RX ORDER — ASPIRIN 81 MG/1
81 TABLET ORAL 2 TIMES DAILY
Status: DISCONTINUED | OUTPATIENT
Start: 2024-10-16 | End: 2024-10-17 | Stop reason: HOSPADM

## 2024-10-16 RX ORDER — FAMOTIDINE 20 MG/1
20 TABLET, FILM COATED ORAL 2 TIMES DAILY
Status: DISCONTINUED | OUTPATIENT
Start: 2024-10-16 | End: 2024-10-17 | Stop reason: HOSPADM

## 2024-10-16 RX ORDER — PHENYLEPHRINE HYDROCHLORIDE 10 MG/ML
INJECTION INTRAVENOUS
Status: DISCONTINUED | OUTPATIENT
Start: 2024-10-16 | End: 2024-10-16

## 2024-10-16 RX ORDER — ASPIRIN 81 MG/1
81 TABLET ORAL 2 TIMES DAILY
Qty: 60 TABLET | Refills: 0 | Status: SHIPPED | OUTPATIENT
Start: 2024-10-16 | End: 2024-11-16

## 2024-10-16 RX ORDER — SODIUM CHLORIDE 0.9 % (FLUSH) 0.9 %
10 SYRINGE (ML) INJECTION
Status: DISCONTINUED | OUTPATIENT
Start: 2024-10-16 | End: 2024-10-16 | Stop reason: HOSPADM

## 2024-10-16 RX ORDER — CELECOXIB 100 MG/1
200 CAPSULE ORAL DAILY
Status: DISCONTINUED | OUTPATIENT
Start: 2024-10-16 | End: 2024-10-17 | Stop reason: HOSPADM

## 2024-10-16 RX ORDER — LIDOCAINE HYDROCHLORIDE 10 MG/ML
1 INJECTION, SOLUTION EPIDURAL; INFILTRATION; INTRACAUDAL; PERINEURAL
Status: DISCONTINUED | OUTPATIENT
Start: 2024-10-16 | End: 2024-10-16 | Stop reason: HOSPADM

## 2024-10-16 RX ORDER — ROPIVACAINE/EPI/CLONIDINE/KET 2.46-0.005
SYRINGE (ML) INJECTION
Status: DISCONTINUED | OUTPATIENT
Start: 2024-10-16 | End: 2024-10-16 | Stop reason: HOSPADM

## 2024-10-16 RX ORDER — CELECOXIB 200 MG/1
200 CAPSULE ORAL 2 TIMES DAILY
Qty: 14 CAPSULE | Refills: 0 | Status: SHIPPED | OUTPATIENT
Start: 2024-10-16

## 2024-10-16 RX ORDER — OXYCODONE HYDROCHLORIDE 5 MG/1
10 TABLET ORAL
Status: DISCONTINUED | OUTPATIENT
Start: 2024-10-16 | End: 2024-10-17 | Stop reason: HOSPADM

## 2024-10-16 RX ORDER — ACETAMINOPHEN 500 MG
1000 TABLET ORAL EVERY 8 HOURS PRN
Qty: 42 TABLET | Refills: 0 | Status: SHIPPED | OUTPATIENT
Start: 2024-10-16

## 2024-10-16 RX ORDER — GLUCAGON 1 MG
1 KIT INJECTION
Status: DISCONTINUED | OUTPATIENT
Start: 2024-10-16 | End: 2024-10-16 | Stop reason: HOSPADM

## 2024-10-16 RX ORDER — NALOXONE HCL 0.4 MG/ML
0.02 VIAL (ML) INJECTION
Status: DISCONTINUED | OUTPATIENT
Start: 2024-10-16 | End: 2024-10-17 | Stop reason: HOSPADM

## 2024-10-16 RX ORDER — CEFAZOLIN 2 G/1
2 INJECTION, POWDER, FOR SOLUTION INTRAMUSCULAR; INTRAVENOUS
Status: COMPLETED | OUTPATIENT
Start: 2024-10-16 | End: 2024-10-16

## 2024-10-16 RX ORDER — ACETAMINOPHEN 500 MG
1000 TABLET ORAL
Status: COMPLETED | OUTPATIENT
Start: 2024-10-16 | End: 2024-10-16

## 2024-10-16 RX ORDER — SODIUM CHLORIDE 9 MG/ML
INJECTION, SOLUTION INTRAVENOUS CONTINUOUS
Status: ACTIVE | OUTPATIENT
Start: 2024-10-16 | End: 2024-10-17

## 2024-10-16 RX ORDER — HYDROMORPHONE HYDROCHLORIDE 2 MG/ML
0.2 INJECTION, SOLUTION INTRAMUSCULAR; INTRAVENOUS; SUBCUTANEOUS EVERY 5 MIN PRN
Status: DISCONTINUED | OUTPATIENT
Start: 2024-10-16 | End: 2024-10-16 | Stop reason: HOSPADM

## 2024-10-16 RX ORDER — PROCHLORPERAZINE EDISYLATE 5 MG/ML
5 INJECTION INTRAMUSCULAR; INTRAVENOUS EVERY 6 HOURS PRN
Status: DISCONTINUED | OUTPATIENT
Start: 2024-10-16 | End: 2024-10-17 | Stop reason: HOSPADM

## 2024-10-16 RX ORDER — MORPHINE SULFATE 4 MG/ML
2 INJECTION, SOLUTION INTRAMUSCULAR; INTRAVENOUS
Status: DISCONTINUED | OUTPATIENT
Start: 2024-10-16 | End: 2024-10-17 | Stop reason: HOSPADM

## 2024-10-16 RX ORDER — FENTANYL CITRATE 50 UG/ML
INJECTION, SOLUTION INTRAMUSCULAR; INTRAVENOUS
Status: DISCONTINUED | OUTPATIENT
Start: 2024-10-16 | End: 2024-10-16

## 2024-10-16 RX ORDER — PROPOFOL 10 MG/ML
VIAL (ML) INTRAVENOUS CONTINUOUS PRN
Status: DISCONTINUED | OUTPATIENT
Start: 2024-10-16 | End: 2024-10-16

## 2024-10-16 RX ORDER — SODIUM CHLORIDE, SODIUM LACTATE, POTASSIUM CHLORIDE, CALCIUM CHLORIDE 600; 310; 30; 20 MG/100ML; MG/100ML; MG/100ML; MG/100ML
INJECTION, SOLUTION INTRAVENOUS CONTINUOUS
Status: DISCONTINUED | OUTPATIENT
Start: 2024-10-16 | End: 2024-10-17 | Stop reason: HOSPADM

## 2024-10-16 RX ORDER — OXYCODONE HYDROCHLORIDE 5 MG/1
5-10 TABLET ORAL EVERY 4 HOURS PRN
Qty: 40 TABLET | Refills: 0 | Status: SHIPPED | OUTPATIENT
Start: 2024-10-16

## 2024-10-16 RX ORDER — METHOCARBAMOL 500 MG/1
500 TABLET, FILM COATED ORAL 4 TIMES DAILY
Qty: 40 TABLET | Refills: 0 | Status: SHIPPED | OUTPATIENT
Start: 2024-10-16 | End: 2024-10-27

## 2024-10-16 RX ORDER — SODIUM CHLORIDE 9 MG/ML
INJECTION, SOLUTION INTRAVENOUS
Status: DISCONTINUED | OUTPATIENT
Start: 2024-10-16 | End: 2024-10-16 | Stop reason: HOSPADM

## 2024-10-16 RX ORDER — POLYETHYLENE GLYCOL 3350 17 G/17G
17 POWDER, FOR SOLUTION ORAL DAILY
Status: DISCONTINUED | OUTPATIENT
Start: 2024-10-16 | End: 2024-10-17 | Stop reason: HOSPADM

## 2024-10-16 RX ORDER — ONDANSETRON HYDROCHLORIDE 2 MG/ML
4 INJECTION, SOLUTION INTRAVENOUS EVERY 8 HOURS PRN
Status: DISCONTINUED | OUTPATIENT
Start: 2024-10-16 | End: 2024-10-17 | Stop reason: HOSPADM

## 2024-10-16 RX ORDER — FENTANYL CITRATE 50 UG/ML
25 INJECTION, SOLUTION INTRAMUSCULAR; INTRAVENOUS EVERY 5 MIN PRN
Status: DISCONTINUED | OUTPATIENT
Start: 2024-10-16 | End: 2024-10-16 | Stop reason: HOSPADM

## 2024-10-16 RX ORDER — MUPIROCIN 20 MG/G
1 OINTMENT TOPICAL
Status: COMPLETED | OUTPATIENT
Start: 2024-10-16 | End: 2024-10-16

## 2024-10-16 RX ORDER — DEXAMETHASONE SODIUM PHOSPHATE 4 MG/ML
INJECTION, SOLUTION INTRA-ARTICULAR; INTRALESIONAL; INTRAMUSCULAR; INTRAVENOUS; SOFT TISSUE
Status: DISCONTINUED | OUTPATIENT
Start: 2024-10-16 | End: 2024-10-16

## 2024-10-16 RX ORDER — OXYCODONE HYDROCHLORIDE 5 MG/1
5 TABLET ORAL
Status: DISCONTINUED | OUTPATIENT
Start: 2024-10-16 | End: 2024-10-17 | Stop reason: HOSPADM

## 2024-10-16 RX ORDER — CELECOXIB 100 MG/1
400 CAPSULE ORAL ONCE
Status: COMPLETED | OUTPATIENT
Start: 2024-10-16 | End: 2024-10-16

## 2024-10-16 RX ORDER — ACETAMINOPHEN 500 MG
1000 TABLET ORAL EVERY 6 HOURS
Status: DISCONTINUED | OUTPATIENT
Start: 2024-10-16 | End: 2024-10-17 | Stop reason: HOSPADM

## 2024-10-16 RX ORDER — PROPOFOL 10 MG/ML
VIAL (ML) INTRAVENOUS
Status: DISCONTINUED | OUTPATIENT
Start: 2024-10-16 | End: 2024-10-16

## 2024-10-16 RX ORDER — DOCUSATE SODIUM 100 MG/1
100 CAPSULE, LIQUID FILLED ORAL 2 TIMES DAILY
Qty: 30 CAPSULE | Refills: 0 | Status: SHIPPED | OUTPATIENT
Start: 2024-10-16

## 2024-10-16 RX ORDER — LIDOCAINE HYDROCHLORIDE 20 MG/ML
INJECTION INTRAVENOUS
Status: DISCONTINUED | OUTPATIENT
Start: 2024-10-16 | End: 2024-10-16

## 2024-10-16 RX ORDER — MUPIROCIN 20 MG/G
1 OINTMENT TOPICAL 2 TIMES DAILY
Status: DISCONTINUED | OUTPATIENT
Start: 2024-10-16 | End: 2024-10-17 | Stop reason: HOSPADM

## 2024-10-16 RX ORDER — MIDAZOLAM HYDROCHLORIDE 1 MG/ML
INJECTION INTRAMUSCULAR; INTRAVENOUS
Status: DISCONTINUED | OUTPATIENT
Start: 2024-10-16 | End: 2024-10-16

## 2024-10-16 RX ADMIN — CEFAZOLIN 2 G: 2 INJECTION, POWDER, FOR SOLUTION INTRAMUSCULAR; INTRAVENOUS at 03:10

## 2024-10-16 RX ADMIN — SENNOSIDES AND DOCUSATE SODIUM 1 TABLET: 50; 8.6 TABLET ORAL at 10:10

## 2024-10-16 RX ADMIN — HYDROMORPHONE HYDROCHLORIDE 0.2 MG: 2 INJECTION INTRAMUSCULAR; INTRAVENOUS; SUBCUTANEOUS at 10:10

## 2024-10-16 RX ADMIN — MEPIVACAINE HYDROCHLORIDE 3 ML: 15 INJECTION, SOLUTION EPIDURAL; INFILTRATION at 07:10

## 2024-10-16 RX ADMIN — FAMOTIDINE 20 MG: 20 TABLET ORAL at 09:10

## 2024-10-16 RX ADMIN — PREGABALIN 75 MG: 50 CAPSULE ORAL at 09:10

## 2024-10-16 RX ADMIN — MUPIROCIN 1 G: 20 OINTMENT TOPICAL at 09:10

## 2024-10-16 RX ADMIN — OXYCODONE HYDROCHLORIDE 5 MG: 5 TABLET ORAL at 10:10

## 2024-10-16 RX ADMIN — PREGABALIN 75 MG: 50 CAPSULE ORAL at 06:10

## 2024-10-16 RX ADMIN — PHENYLEPHRINE HYDROCHLORIDE 100 MCG: 10 INJECTION INTRAVENOUS at 08:10

## 2024-10-16 RX ADMIN — FENTANYL CITRATE 25 MCG: 50 INJECTION, SOLUTION INTRAMUSCULAR; INTRAVENOUS at 08:10

## 2024-10-16 RX ADMIN — SODIUM CHLORIDE: 0.9 INJECTION, SOLUTION INTRAVENOUS at 07:10

## 2024-10-16 RX ADMIN — TRANEXAMIC ACID 1000 MG: 10 INJECTION, SOLUTION INTRAVENOUS at 09:10

## 2024-10-16 RX ADMIN — CELECOXIB 200 MG: 100 CAPSULE ORAL at 10:10

## 2024-10-16 RX ADMIN — DEXAMETHASONE SODIUM PHOSPHATE 4 MG: 4 INJECTION, SOLUTION INTRAMUSCULAR; INTRAVENOUS at 07:10

## 2024-10-16 RX ADMIN — GLYCOPYRROLATE 0.1 MG: 0.2 INJECTION, SOLUTION INTRAMUSCULAR; INTRAVITREAL at 07:10

## 2024-10-16 RX ADMIN — ONDANSETRON 4 MG: 2 INJECTION, SOLUTION INTRAMUSCULAR; INTRAVENOUS at 07:10

## 2024-10-16 RX ADMIN — PROPOFOL 20 MG: 10 INJECTION, EMULSION INTRAVENOUS at 07:10

## 2024-10-16 RX ADMIN — FENTANYL CITRATE 25 MCG: 50 INJECTION, SOLUTION INTRAMUSCULAR; INTRAVENOUS at 07:10

## 2024-10-16 RX ADMIN — LIDOCAINE HYDROCHLORIDE 60 MG: 20 INJECTION, SOLUTION INTRAVENOUS at 07:10

## 2024-10-16 RX ADMIN — PROPOFOL 50 MCG/KG/MIN: 10 INJECTION, EMULSION INTRAVENOUS at 07:10

## 2024-10-16 RX ADMIN — MIDAZOLAM HYDROCHLORIDE 1 MG: 1 INJECTION INTRAMUSCULAR; INTRAVENOUS at 07:10

## 2024-10-16 RX ADMIN — TRANEXAMIC ACID 1000 MG: 10 INJECTION, SOLUTION INTRAVENOUS at 07:10

## 2024-10-16 RX ADMIN — MUPIROCIN 1 G: 20 OINTMENT TOPICAL at 06:10

## 2024-10-16 RX ADMIN — CEFAZOLIN 2 G: 2 INJECTION, POWDER, FOR SOLUTION INTRAMUSCULAR; INTRAVENOUS at 11:10

## 2024-10-16 RX ADMIN — FENTANYL CITRATE 25 MCG: 50 INJECTION, SOLUTION INTRAMUSCULAR; INTRAVENOUS at 09:10

## 2024-10-16 RX ADMIN — METHOCARBAMOL TABLETS 750 MG: 750 TABLET, COATED ORAL at 09:10

## 2024-10-16 RX ADMIN — CEFAZOLIN 2 G: 2 INJECTION, POWDER, FOR SOLUTION INTRAMUSCULAR; INTRAVENOUS at 07:10

## 2024-10-16 RX ADMIN — FAMOTIDINE 20 MG: 20 TABLET ORAL at 10:10

## 2024-10-16 RX ADMIN — ACETAMINOPHEN 1000 MG: 500 TABLET ORAL at 06:10

## 2024-10-16 RX ADMIN — FENTANYL CITRATE 50 MCG: 50 INJECTION, SOLUTION INTRAMUSCULAR; INTRAVENOUS at 09:10

## 2024-10-16 RX ADMIN — SODIUM CHLORIDE: 9 INJECTION, SOLUTION INTRAVENOUS at 01:10

## 2024-10-16 RX ADMIN — METHOCARBAMOL 500 MG: 100 INJECTION, SOLUTION INTRAMUSCULAR; INTRAVENOUS at 10:10

## 2024-10-16 RX ADMIN — CELECOXIB 400 MG: 100 CAPSULE ORAL at 06:10

## 2024-10-16 NOTE — PT/OT/SLP PROGRESS
Physical Therapy Treatment    Patient Name:  Shraddha Rea   MRN:  [id number]    Recommendations:     Discharge Recommendations: Low Intensity Therapy  Discharge Equipment Recommendations: none  Barriers to discharge:  gait instability, impaired balance    Assessment:     Shraddha Rea is a 74 y.o. female admitted with a medical diagnosis of <principal problem not specified>.  She presents with the following impairments/functional limitations: weakness, impaired endurance, impaired functional mobility, gait instability, impaired balance, orthopedic precautions .    Rehab Prognosis: Good; patient would benefit from acute skilled PT services to address these deficits and reach maximum level of function.    Recent Surgery: Procedure(s) (LRB):  ARTHROPLASTY, HIP, TOTAL, USING COMPUTER-ASSISTED NAVIGATION (Left) Day of Surgery    Plan:     During this hospitalization, patient to be seen BID to address the identified rehab impairments via gait training, therapeutic activities, therapeutic exercises, neuromuscular re-education and progress toward the following goals:    Plan of Care Expires:  10/30/24    Subjective     Chief Complaint: dizziness and general weakness   Patient/Family Comments/goals: Pt was agreeable for therapy  Pain/Comfort:  Pain Rating 1: 0/10      Objective:     Communicated with TERRI Mckeon prior to session. Patient found HOB elevated with peripheral IV, blood pressure cuff, pulse ox (continuous), B SCD upon PT entry to room. Pt seemed more awake than previous session. Pt cognition WFL. Pt was able to attend to all tasks and activities. Pt seemed in a pleasant mood. Pt last BP readin/63.    General Precautions: Standard, fall  Orthopedic Precautions: LLE posterior precautions  Braces: N/A  Respiratory Status: Room air     Functional Mobility:  Bed Mobility:     Rolling Right: minimum assistance for LLE management and precaution adherence.   Scooting: stand by assistance for LLE  management and precaution adherence.   Supine to Sit: minimum assistance for LLE management and precaution adherence.   Sit to Supine: minimum assistance for LLE management and precaution adherence.   Transfers:     Sit to Stand:  minimum assistance with rolling walker. Completed 2x during session.  Gait: Patient ambulated FWB/WBAT: bilateral lower extremity 4 lateral steps to pt's left on level tile with Rolling Walker with Moderate Assistance.  Pt demonstrated decreased jie, decreased velocity of limb motion, decreased step length, and decreased weight-shifting ability. Impairments contributing to gait deviations include impaired balance and L knee buckling. First ambulation trial terminated due to pt's complaints of weakness and dizziness. Pt required a 1 minute seated resting break before second trial. Pt ambulated 4 lateral steps to pt's left w/ RW and ModA w/ a PT blocking pt's left knee from buckling.    Balance: Pt demonstrated fair static sitting balance 2/2 dizziness for ~3 minutes. Pt BP taken seated EOB: 122/57. Pt demonstrated poor standing dynamic/static balance w/ RW and [place] 2/2 L knee buckling.      AM-PAC 6 CLICK MOBILITY  Turning over in bed (including adjusting bedclothes, sheets and blankets)?: 3  Sitting down on and standing up from a chair with arms (e.g., wheelchair, bedside commode, etc.): 3  Moving from lying on back to sitting on the side of the bed?: 3  Moving to and from a bed to a chair (including a wheelchair)?: 2  Need to walk in hospital room?: 2  Climbing 3-5 steps with a railing?: 1  Basic Mobility Total Score: 14     Treatment & Education:  Pt educated on posterior approach hip precautions for LLE (NO hip flexion >90 degrees, NO hip adduction past midline, and NO Hip IR.)  Pt educated on benefits of exercise and activity to reduce muscle atrophy 2/2 prolonged hospital stays.    Patient left HOB elevated with pillow between legs, B SCD, all lines intact, call button in reach,  and TERRI Mckeon notified.    GOALS:   Multidisciplinary Problems       Physical Therapy Goals          Problem: Physical Therapy    Goal Priority Disciplines Outcome Interventions   Physical Therapy Goal     PT, PT/OT Progressing    Description: Goals to be met by: 10/30/24     Patient will increase functional independence with mobility by performing:    Supine to sit with Modified [place]  Sit to supine with Modified [place]  Rolling to Left and Right with Modified [place].  Sit to stand transfer with Modified [place]  Gait  x 50 feet with Stand-by Assistance using Rolling Walker.   Sitting at edge of bed 10 minutes with [place]  Stand for 10 minutes with Modified [place] using Rolling Walker  Lower extremity exercise program x10 reps per handout, with independence    Pt will benefit from skilled acute care PT to improve functional mobility, improve endurance, and reduce burden of care.                         Time Tracking:     PT Received On: 10/16/24  PT Start Time: 1435     PT Stop Time: 1446  PT Total Time (min): 11 min     Billable Minutes: Therapeutic Activity 11    Treatment Type: Treatment  PT/PTA: PT     Number of PTA visits since last PT visit: 0     10/16/2024

## 2024-10-16 NOTE — ANESTHESIA PROCEDURE NOTES
Spinal    Diagnosis: left hip osteoarthritis  Patient location during procedure: OR  Start time: 10/16/2024 7:17 AM  Timeout: 10/16/2024 7:16 AM  End time: 10/16/2024 7:20 AM    Staffing  Authorizing Provider: Chhaya Guillaume MD  Performing Provider: Chhaya Guillaume MD    Staffing  Performed by: Chhaya Guillaume MD  Authorized by: Chhaya Guillaume MD    Preanesthetic Checklist  Completed: patient identified, IV checked, site marked, risks and benefits discussed, surgical consent, monitors and equipment checked, pre-op evaluation and timeout performed  Spinal Block  Patient position: sitting  Prep: ChloraPrep  Patient monitoring: heart rate, cardiac monitor and continuous pulse ox  Approach: midline  Location: L4-5  Injection technique: single shot  CSF Fluid: clear free-flowing CSF  Needle  Needle type: pencil-tip   Needle gauge: 25 G  Needle length: 3.5 in  Additional Documentation: incremental injection, negative aspiration for CSF, negative aspiration for heme and no paresthesia on injection  Needle localization: anatomical landmarks  Assessment  Sensory level: T4   Dermatomal levels determined by pinch or prick  Ease of block: easy  Patient's tolerance of the procedure: comfortable throughout block and no complaints  Additional Notes  100 mcg epinephrine added to spinal.  Medications:    Medications: mepivacaine (CARBOCAINE) injection 15 mg/mL (1.5%) - Other   3 mL - 10/16/2024 7:20:00 AM

## 2024-10-16 NOTE — PLAN OF CARE
Problem: Occupational Therapy  Goal: Occupational Therapy Goal  Description: Goals to be met by: 10/19/2024     Patient will increase functional independence with ADLs by performing:    UE Dressing with Modified [place].  LE Dressing with Stand-by Assistance and Assistive Devices as needed.  Grooming while standing at sink with Stand-by Assistance and Assistive Devices as needed.  Toileting from toilet with Modified [place], Set-up Assistance, and Assistive Devices as needed for hygiene and clothing management.   Supine to sit with Modified [place] and Supervision with  posterior hip precautions  Step transfer with Modified [place] and Supervision  Toilet transfer to toilet with Modified [place] and Supervision.  Upper extremity exercise program x10 reps per handout, with independence.  The patient will verbalize understanding of posterior hip prec  Outcome: Progressing     The patient will benefit from LIT and caregiver assistance.  DME: [facility] and a hip kit

## 2024-10-16 NOTE — TRANSFER OF CARE
Anesthesia Transfer of Care Note    Patient: Shraddha Rea    Procedure(s) Performed: Procedure(s) (LRB):  ARTHROPLASTY, HIP, TOTAL, USING COMPUTER-ASSISTED NAVIGATION (Left)    Patient location: PACU    Anesthesia Type: spinal and MAC    Transport from OR: Transported from OR on room air with adequate spontaneous ventilation    Post pain: adequate analgesia    Post assessment: no apparent anesthetic complications and tolerated procedure well    Post vital signs: stable    Level of consciousness: awake    Nausea/Vomiting: no nausea/vomiting    Complications: none    Transfer of care protocol was followed    Last vitals: Visit Vitals  BP (!) 110/59 (BP Location: Right arm)   Pulse 74   Temp 36.3 °C (97.3 °F) (Temporal)   Resp 17   Wt 62.7 kg (138 lb 2.2 oz)   SpO2 100%   Breastfeeding No   BMI 23.71 kg/m²

## 2024-10-16 NOTE — DISCHARGE INSTRUCTIONS
Orthopedic Discharge Orders: Home [place]         Expected Discharge Date: POD1    Diagnoses:  Post-op hip replacement    Patient is homebound due to:   Pt requires home health services due to taxing effort to leave the home as a result of immobility from Post-op hip replacement    Weight Bearing Status:   full weight bearing    Posterior Hip Precautions for 6 weeks, AVOID:  -Avoid greater than 90 degrees of flexion, internal rotation, and adduction  -Avoid extension, external rotation, and abduction  -Must sleep with hip abduction pillow or regular pillow b/t legs    Physical Therapy   3 times a week for 2 weeks (Call for further orders)  - Ambulate with a rolling walker  - Progress to cane  -Instruct on ROM and strengthening of knee  -Set up for outpt once staples removed and MOD 1 with cane    Wound Care:   If patient is discharged with aqua marlon/silver dressing, leave on for 7-10 days unless saturated border to border, then follow instructions below:  Cleanse with wound cleanser or normal saline and apply island dressing.  If island dressing not available apply gauze and tegaderm.  Change surgical dressing 3 times a week and PRN  in standing position. Pt may shower if surgical dressing is waterproof. Removal and replacement of dressing after shower only needed if incision is suspected to have gotten wet during shower.  Otherwise change as previously described depending on dressing/drainage. No soaking in the tub or hot tub for 6 weeks.    Wear  TEDS Bilateral Knee High Stockings for 3  Weeks. OK to remove stockings 1-2 hours/day max if desired.    Cold therapy/Ice: encouraged at least 20 minutes 2-3 times daily or more if desired.  Incision must be kept waterproof while icing.      Contact:  Please contact [phone] with questions or concerns    BLOOD THINNER:    If sent home on Lovenox : 28 days post-op for TKR /ROBER  If sent home home on ASA:    81mg   BID x 4 weeks  If on Plavix, will resume home dosing  regimen and stopping hospital anticoagulants after 5 days post op   Once finished with prescribed blood thinner, patients can return to pre-surgical ASA dosage if they took ASA before surgery.       Outpatient Therapy Preference: [facility] 2 weeks after operation  [address] [facility]  [place] [address]    DME:  - Per PT/OT Recommendation

## 2024-10-16 NOTE — CONSULTS
Home health arranged   Follow-up Information       [place], [facility] - Follow up.    Specialty: Home Health Services  Why: Home Health- you will be contacted to schedule day and time to come to home to begin services  Contact information:  [address]  [address]  [place] [address]  [phone]

## 2024-10-16 NOTE — DISCHARGE SUMMARY
"[facility] [place] - Surgery  Discharge Note  Short Stay    Procedure(s) (LRB):  ARTHROPLASTY, HIP, TOTAL, USING COMPUTER-ASSISTED NAVIGATION (Left)      OUTCOME: Patient tolerated treatment/procedure well without complication and is now ready for discharge.    DISPOSITION: Home-Health Care [facility]    FINAL DIAGNOSIS: Left Hip OA    FOLLOWUP: In clinic    DISCHARGE INSTRUCTIONS:      Justification of Rolling Walker:  The mobility limitation can not be sufficiently resolved by the use of a cane.  Patient's functional mobility deficit can be sufficiently resolved with the use of a rolling walker.  Patient has mobility limitation significantly impairs their ability to participate in 1 or more activities of daily living.  The use of the rolling walker we will significantly improve the patient's ability to participate in MRADLS and the patient will use it on a regular basis in the home.    Discharge Procedure Orders   WALKER FOR HOME USE     Order Specific Question Answer Comments   Type of Walker: Adult (5'4"-6'6")    With wheels? Yes    Height: 5'4    Weight: 62.7 kg (138 lb 2.2 oz)    Length of need (1-99 months): 99    Please check all that apply: Walker will be used for gait training.      Activity as tolerated     Lifting restrictions   Order Comments: No strenuous exercise or lifting of > 10 lbs     Weight bearing as tolerated     No driving, operating heavy equipment or signing legal documents while taking pain medication     Call MD for:  temperature >100.4     Call MD for:  persistent nausea and vomiting     Call MD for:  severe uncontrolled pain     Call MD for:  difficulty breathing, headache or visual disturbances     Call MD for:  redness, tenderness, or signs of infection (pain, swelling, redness, odor or green/yellow discharge around incision site)     Call MD for:  hives     Call MD for:  persistent dizziness or light-headedness     Call MD for:  extreme fatigue        TIME SPENT ON DISCHARGE: 20 minutes  "

## 2024-10-16 NOTE — PLAN OF CARE
Problem: Physical Therapy  Goal: Physical Therapy Goal  Description: Goals to be met by: 10/30/24     Patient will increase functional independence with mobility by performing:    Supine to sit with Modified [place]  Sit to supine with Modified [place]  Rolling to Left and Right with Modified [place].  Sit to stand transfer with Modified [place]  Gait  x 50 feet with Stand-by Assistance using Rolling Walker.   Sitting at edge of bed 10 minutes with [place]  Stand for 10 minutes with Modified [place] using Rolling Walker  Lower extremity exercise program x10 reps per handout, with independence    Pt will benefit from skilled acute care PT to improve functional mobility, improve endurance, and reduce burden of care.    10/16/2024 1739 by Rafael Goldberg, LUIS  Outcome: Progressing

## 2024-10-16 NOTE — OP NOTE
OPERATIVE NOTE     PATIENT NAME: Shraddha Rea   MRN: [id number]      Surgery Date: 10/16/24  Surgeon(s) and Assistant(s): MD. Daphne Molina CST., CFA      Procedure(s): left Primary Total Hip Arthroplasty     BMI:  Body mass index is 23.71 kg/m².      Anesthesia:  Spinal        Attestation:   I was present for all of the critical portions of the operation and performed all critical portions.  The medical assistant performed the superficial closure under supervision, and assisted during the operation. I was immediately available for the duration of the entire case.      Preoperative Diagnosis:  left  Hip Arthritis     Postoperative Diagnosis: Same     Findings:  See Full Operative Report    Complications: None     EBL: 150cc     Implants:   Implant Name Type Inv. Item Serial No.  Lot No. LRB No. Used Action   INSERT TRIDENT X3 ID 36MM 0 DE - [id number]  INSERT TRIDENT X3 ID 36MM 0 DE  [employer]. [id number] Left 1 Implanted and Explanted   STEM FEMORAL ACCOL SZ 4 35X105 - [id number]  STEM FEMORAL ACCOL SZ 4 35X105  [employer]. [id number] Left 1 Implanted   HEAD FEM V40 36MM +0MM BIO - [id number]  HEAD FEM V40 36MM +0MM BIO  [employer]. [id number] Left 1 Implanted and Explanted   LINER ACET SZ E 42MM COCR - [id number]  LINER ACET SZ E 42MM COCR  [employer]. [id number] Left 1 Implanted   INSERT AMD/MDM X3 48MM - [id number]  INSERT AMD/MDM X3 48MM  [employer]. [id number] Left 1 Implanted   HEAD BIOLOX TAPER -2.7X28MM - [id number]  HEAD BIOLOX TAPER -2.7X28MM  [employer]. [id number] Left 1 Implanted       Drains: None     Problem List:   Problem List Items Addressed This Visit    None  Visit Diagnoses       Primary osteoarthritis of left hip    -  Primary    Relevant Orders    Place in Outpatient (Completed)    Vital signs    Notify  Physician - Potential Need of Opioid Reversal    Notify Anesthesiologist if patient on home insulin pump    Vital signs - notify MD    Diet NPO    Activity as tolerated    Lifting restrictions    Weight bearing as tolerated    No driving, operating heavy equipment or signing legal documents while taking pain medication    Call MD for:  temperature >100.4    Call MD for:  persistent nausea and vomiting    Call MD for:  severe uncontrolled pain    Call MD for:  difficulty breathing, headache or visual disturbances    Call MD for:  redness, tenderness, or signs of infection (pain, swelling, redness, odor or green/yellow discharge around incision site)    Call MD for:  hives    Call MD for:  persistent dizziness or light-headedness    Call MD for:  extreme fatigue    Insert peripheral IV    FOOT COMPRESSION PUMP    Place foot compression device    Chlorhexidine (CHG) 2% Wipes    Ortho Pathway Patient    WALKER FOR HOME USE    Inpatient consult to Social Work/Case Management    Arthritis of left hip                   OPERATIVE INDICATIONS: The patient has a history of progressive left hip pain and arthritis. Their hip pain is severe with activity and has progressed significantly. X-rays reveal eburnation of articular cartilage on the superior weight-bearing surface of the hip with joint space narrowing and acetabular and femoral neck osteophytes consistent with advanced hip osteoarthritis. Non-operative treatment has been attempted, but has not improved or controlled symptoms during normal daily activities. Motion has become limited and rotation severely restricted. A total hip arthroplasty was recommended at this time. The risks, benefits and potential complications of the arthroplasty surgery were discussed with the patient in detail. Specific details of the procedure, hospitalization, recovery, rehabilitation, and long-term precautions were also provided. Pre-operative teaching was provided. Implant/prosthesis  selection was outlined, and the many options available were explained; the final choice will be made at the time of the procedure to match the anatomy and condition of the bone, ligaments, tendons, and muscles. Understanding of all topics was conveyed to me by the patient, and consent was given to proceed with a total hip arthroplasty.      The patient was seen by Internal Medicine/Anesthesia for pre-operative optimization. Jayna-operative blood management and the potential for blood transfusion were discussed with risks and options clearly outlined. We discussed the risks of the procedure in detail, which included but is not limited to infection, bleeding, scarring, injury to blood vessels, nerves, muscular structures. We discussed specifically instability/dislocation, fracture, and leg-length complications.      OPERATIVE PROCEDURE: The patient was identified and brought into the Operating Room by the anesthesia and nursing team. Spinal anesthesia was successfully performed.  Intravenous antibiotic of Cefazolin prophylaxis dosing was confirmed. The patient was then positioned in the lateral decubitus on the hip pegboard. An axillary roll was placed, and all other pressure points were checked and padded. The hip was then examined and restrictions noted. A relative leg length assessment was carried out and markers were placed for intra-operative assessment. The leg was then prepped and draped in the usual sterile fashion.     A surgical time-out was performed immediately preceding the incision with all personnel in the operating room; the patient identity was again confirmed, the surgical site and extremity were identified and confirmed, X-rays were reviewed, and availability of the appropriate surgical equipment was established.      The left hip was then exposed through a limited skin incision centered over the greater trochanter. Dissection was carried down through skin and subcutaneous tissue to the gluteal  fascia. The gluteus robb was split posteriorly over the trochanter in the direction of its fibers preserving the ITB.  Bleeders were controlled with electrocautery. A direct superior approach to the hip was accomplished, reflecting the piriformis.  A T-capsulotomy was performed for later repair.  The remainder of the capsule was not disrupted. The labrum was split and the femoral head mobilized. An in situ neck cut was made and the femoral head was removed. Assessment of the femoral head revealed severe eburnation of articular cartilage with complete loss of weight bearing chondral surface and formation of cysts in the head and neck. Marginal osteophytes were present surrounding the femoral neck.     HEMANTH pins were placed superiorly. Array placed. Registration completed. Single reaming with planned cup was completed. The reamers created an excellent hemispherical bed of bleeding cancellous bone.  The cup was inserted with an excellent press fit. The press-fit was firm, stable, and apically seated.  The Polyethylene bearing/liner was then impacted into place and checked for stability.      Attention was then turned to the femur. The leg was positioned so access did not result in soft-tissue injury. The medullary cavity of the femur was entered and opened with hand reamers. broaches were then employed in an incremental fashion up to the final size. The final broach was then fully seated, had good rotational and axial stability, and was seated at the appropriate height in relation to the greater trochanter and the template. Trial reduction was done.     At this stage she had some instability in 45 degrees of hip flexion and about 30 degrees of IR. I was worried about this level of stability relative to her demands including Yoga. I ensured no osteophyte or soft tissue impingement. I checked Ranawat sign. This was off an I had concern for decreased femoral or acetabular anteversion. I attempted to increase her length  and offset to compensate but got a level of over correcting her leg lengths to a point where she may have a limp and a post op LLD. So at this point, I felt the cup, despite doing this robotically appeared to be in some level of retroversion. I unseated the polyethylene and used manual instrumentation to check the anteversion. It appeared pretty neutral. So I corrected it manually and improved the anteversion by about 10 degrees. I also trailed an MDM construct. Her ranawat sign improved. I was able to decrease her LLD that occurred with the previous trialing and I felt relative to her stability and activity demands this would probably be better to prevent dislocation.      Excellent stability and range of motion was achieved without impingement at any position. Leg lengths were re-created within millimeters based on the markers and relative measurement. The trials were then dislocated and removed. The wound was copiously irrigated, and the permanent femoral stem was then impacted down in approximately 20 degrees of anteversion. The press-fit was firm, and stable to axial and rotational force in all planes. The permanent femoral head was then impacted on the clean trunion. The socket and wound were irrigated, suctioned, and inspected for debris. The final reduction was performed, and again the hip was stable in all planes without impingement when stressed to the extremes.      The capsule was repaired. The wound was irrigated. Instrument and sponge count was completed and confirmed correct. The gluteal fascia was closed with interrupted Stratafix suture. Deep subcutaneous tissue was closed with a running #1 Vicryl, and more superficial with interrupted 2-0 Vicryl. A 3-0 Monocryl stitch was used for the skin. Dermabond adhesive was applied. A sterile silver-impregnated dressing was placed. PAS stockings were placed. The patient was then returned to the supine position on the operating room table. After stability was  confirmed they were transferred to a hospital bed and taken to Recovery/PACU.       POST-OPERATIVE PLAN:  Patient will be transferred to the floor once in stable condition and after radiographs confirm no immediate complications. The patient will be treated with multimodal pain management protocols. They will be placed on anticoagulation of ASA 81 mg to start on POD1. The patient will be weight bearing as tolerated with posterior hip precautions for 6 weeks. They will work with physical therapy, have a graduated diet, and once medially stable and safe for home can be discharged. Patient will follow up with me 3 weeks post operatively. Dressing should be removed by patient 7 days post operatively.

## 2024-10-16 NOTE — NURSING
Patient arrived to floor via transporter tech from PACU. Patient transferred to bed via 2x person assist. AAOX4. Patient was oriented to room, information on communication board, and medication regimen. Bed low adequate lighting provided, side rails x2 up, call bell in reach. Admission assessment completed. IV fluids started. Vitals per chart. Patient denied having any acute distress at this time. None observed.     [facility] [place]es [place] -- 4 Eyes      10/16/2024       Skin assessed on: Admit      [x] No Pressure Injuries Present    []Prevention Measures Documented    [] Yes LDA  for Pressure Injury Previously documented     [] Yes New Pressure Injury Discovered   [] LDA for New Pressure Injury Added      Attending RN:  Melody Coolye, RN     Second RN:  Janet Massey

## 2024-10-16 NOTE — PLAN OF CARE
Orthopedic Discharge Orders: Home [place]         Expected Discharge Date: POD1    Diagnoses:  Post-op hip replacement    Patient is homebound due to:   Pt requires home health services due to taxing effort to leave the home as a result of immobility from Post-op hip replacement    Weight Bearing Status:   full weight bearing    Posterior Hip Precautions for 6 weeks, AVOID:  -Avoid greater than 90 degrees of flexion, internal rotation, and adduction  -Avoid extension, external rotation, and abduction  -Must sleep with hip abduction pillow or regular pillow b/t legs    Physical Therapy   3 times a week for 2 weeks (Call for further orders)  - Ambulate with a rolling walker  - Progress to cane  -Instruct on ROM and strengthening of knee  -Set up for outpt once staples removed and MOD 1 with cane    Wound Care:   If patient is discharged with aqua marlon/silver dressing, leave on for 7-10 days unless saturated border to border, then follow instructions below:  Cleanse with wound cleanser or normal saline and apply island dressing.  If island dressing not available apply gauze and tegaderm.  Change surgical dressing 3 times a week and PRN  in standing position. Pt may shower if surgical dressing is waterproof. Removal and replacement of dressing after shower only needed if incision is suspected to have gotten wet during shower.  Otherwise change as previously described depending on dressing/drainage. No soaking in the tub or hot tub for 6 weeks.    Wear  TEDS Bilateral Knee High Stockings for 3  Weeks. OK to remove stockings 1-2 hours/day max if desired.    Cold therapy/Ice: encouraged at least 20 minutes 2-3 times daily or more if desired.  Incision must be kept waterproof while icing.      Contact:  Please contact [phone] with questions or concerns    BLOOD THINNER:    If sent home on Lovenox : 28 days post-op for TKR /ROBER  If sent home home on ASA:    81mg   BID x 4 weeks  If on Plavix, will resume home dosing  regimen and stopping hospital anticoagulants after 5 days post op   Once finished with prescribed blood thinner, patients can return to pre-surgical ASA dosage if they took ASA before surgery.       Outpatient Therapy Preference: [facility] 2 weeks after operation  [address] [facility]  [place] [address]    DME:  - Per PT/OT Recommendation

## 2024-10-16 NOTE — PROGRESS NOTES
Patient unable to work with PT/OT d/t dizziness. Dr Melendez notified. Will try again in 1-2 hours. Son updated and visitation in PACU facilitated.   VSS.

## 2024-10-16 NOTE — PLAN OF CARE
[facility] for review and arrangement of [facility].  [place] to follow for response.

## 2024-10-16 NOTE — PT/OT/SLP EVAL
Physical Therapy Evaluation    Patient Name:  Shraddha Rea   MRN:  [id number]    Recommendations:     Discharge Recommendations: Low Intensity Therapy   Discharge Equipment Recommendations: rolling walker   Barriers to discharge: impaired balance    Assessment:     Shraddha Rea is a 74 y.o. female admitted with a medical diagnosis of <principal problem not specified>.  She presents with the following impairments/functional limitations: weakness, impaired endurance, impaired self care skills, impaired functional mobility, gait instability, orthopedic precautions.    Rehab Prognosis: Good; patient would benefit from acute skilled PT services to address these deficits and reach maximum level of function.    Recent Surgery: Procedure(s) (LRB):  ARTHROPLASTY, HIP, TOTAL, USING COMPUTER-ASSISTED NAVIGATION (Left) Day of Surgery    Plan:     During this hospitalization, patient to be seen BID to address the identified rehab impairments via gait training, therapeutic activities, therapeutic exercises, neuromuscular re-education and progress toward the following goals:    Plan of Care Expires:  10/30/24    Subjective     Chief Complaint: dizziness  Patient/Family Comments/goals: Pt was agreeable for therapy  Pain/Comfort:  Pain Rating 1: 0/10    Patients cultural, spiritual, Sikh conflicts given the current situation: no    Living Environment:  Pt will be discharging to live w/ her son in [facility] w/ 1 IWLMAR. Pt reports son has a walk-in shower w/ no grab bars. Pt will stay w/ son until she is able to care for self independently.  Prior to admission, patients level of function was independent.  Equipment used at home: cane straight, raised toilet.  DME owned (not currently used): none.  Upon discharge, patient will have assistance from son.    Objective:     Communicated with TERRI Mckeon prior to session.  Patient found HOB elevated with B SCDs, peripheral IV, blood pressure cuff, pulse ox (continuous)  upon PT  entry to room. Pt was asleep upon entry. Pt was easily aroused from sleep. Pt complaints of dizziness and sleepiness. Pt was in a pleasant mood during session. Pt states that she does not take medication often and the medication given is making her dizzy.      General Precautions: Standard, fall  Orthopedic Precautions:LLE posterior precautions, LLE WBAT  Braces: N/A  Respiratory Status: Room air    Exams:  Postural Exam:  Patient presented with the following abnormalities:    -       Rounded shoulders  -       Forward head  Sensation:    -       Intact  light/touch   Skin Integrity/Edema:      -       Skin integrity: Visible skin intact  -       Surgical Site: Inspected w/ no drainage or foul odor.  RLE ROM: WFL  RLE Strength: WFL  LLE ROM: WFL except: Orthopedic posterior hip precautions. NO hip flexion >90 degrees, NO hip adduction past midline, and NO Hip IR  LLE Strength: Deficits: Ankle DF/PF 4/5. Knee and hip strength unable to be assessed 2/2 complaints of dizziness.     Functional Mobility:  Bed Mobility:     Rolling Right: minimum assistance for LLE management and precaution adherence.  Supine to Sit: minimum assistance for LLE management and precaution adherence. Pt complaints of dizziness while seated EOB. Pt unable to keep eyes open for longer than ~20 seconds 2/2 dizziness. Pt BP taken seated EOB: 123/64. After ~4 minutes pt wanted to lay back down.  Sit to Supine: minimum assistance for LLE management and precaution adherence.  Balance: Pt demonstrated fair- static sitting balance 2/2 dizziness for ~4 minutes. Pt refused standing and ambulation trials 2/2 dizziness. PT will follow up later today.      AM-PAC 6 CLICK MOBILITY  Total Score:12     Treatment & Education:  Pt completed the following exercises supine in bed to improve BLE strength:  1x10 reps B ankle pumps  1x10 reps B glut sets  1x10 reps B quad sets     Pt educated on posterior approach hip precautions for LLE (NO hip flexion >90 degrees,  NO hip adduction past midline, and NO Hip IR.)  Pt educated on benefits of exercise and activity to reduce muscle atrophy 2/2 prolonged hospital stays.  Pt educated on skilled acute care PT POC.     Patient left HOB elevated with all lines intact, call button in reach, and TERRI Mckeon notified. B SCDs reapplied.     GOALS:   Multidisciplinary Problems       Physical Therapy Goals          Problem: Physical Therapy    Goal Priority Disciplines Outcome Interventions   Physical Therapy Goal     PT, PT/OT Progressing    Description: Goals to be met by: 10/30/24     Patient will increase functional independence with mobility by performing:    Supine to sit with Modified Runnels  Sit to supine with Modified Runnels  Rolling to Left and Right with Modified Runnels.  Sit to stand transfer with Modified Runnels  Gait  x 20 feet with Stand-by Assistance using Rolling Walker.   Sitting at edge of bed 10 minutes with Runnels  Stand for 10 minutes with Modified Runnels using Rolling Walker  Lower extremity exercise program x10 reps per handout, with independence    Pt will benefit from skilled acute care PT to improve functional mobility, improve endurance, and reduce burden of care.                         History:     Past Medical History:   Diagnosis Date    Arthritis     Personal history of colonic polyps 10/18/2021    2 mm ployp in the cecum,2 mm polyp in the descending colon       Past Surgical History:   Procedure Laterality Date    APPENDECTOMY      HYSTERECTOMY         Time Tracking:     PT Received On: 10/16/24  PT Start Time: 1204     PT Stop Time: 1220  PT Total Time (min): 16 min     Billable Minutes: Evaluation 16      10/16/2024

## 2024-10-16 NOTE — PT/OT/SLP EVAL
"Occupational Therapy Evaluation     Name: Shraddha Rea  MRN: [id number]  Admitting Diagnosis: <principal problem not specified>  Recent Surgery: Procedure(s) (LRB):  ARTHROPLASTY, HIP, TOTAL, USING COMPUTER-ASSISTED NAVIGATION (Left) Day of Surgery    Recommendations:     Discharge Recommendations: Low Intensity Therapy (PT and OT)  Level of Assistance Recommended: Intermittent assistance  Discharge Equipment Recommendations: walker, rolling, hip kit  Barriers to discharge:  (requires assist for self care and functional mobility, required VC to observe posterior hip prec)    Assessment:     Shraddha Rea is a 74 y.o. female with a medical diagnosis of <principal problem not specified>. She presents with performance deficits affecting function including weakness, impaired endurance, impaired self care skills, impaired functional mobility, gait instability, impaired balance, decreased coordination, decreased lower extremity function, decreased safety awareness, decreased ROM, impaired skin, orthopedic precautions. The patient ability to participate in OT was limited by the c/o dizziness and feeling drowsy. The patient was unable to keep her eyes open to receive education re: Posterior Hip Precautions.    Rehab Prognosis: Good; patient would benefit from acute OT services to address these deficits and reach maximum level of function.    Plan:     Patient to be seen 5 x/week to address the above listed problems via self-care/home management, therapeutic activities, therapeutic exercises  Plan of Care Expires: 10/19/24  Plan of Care Reviewed with: patient    Subjective     Chief Complaint: dizziness and feels sleepy  Patient Comments/Goals: unable to amb stating "I don't usually take medication"  Pain/Comfort:  Pain Rating 1: 0/10    Patients cultural, spiritual, Congregation conflicts given the current situation: no    Social History:  Living Environment: Patient lives alone but plans to D/C to her son's [facility] " with 1 WILMAR and a WIS.   Prior Level of Function: Prior to admission, patient was independent  Roles and Routines: Patient was driving prior to admission.  Equipment Used at Home: cane, straight, raised toilet (reacher and long shoe horn)  DME owned (not currently used): none  Assistance Upon Discharge:  son    Objective:     Communicated with nurseNmeo prior to session. Patient found HOB elevated with telemetry, peripheral IV, pulse ox (continuous), blood pressure cuff, SCD upon OT entry to room.    General Precautions: Standard, fall   Orthopedic Precautions: LLE posterior precautions   Braces: N/A    Respiratory Status: Room air    Occupational Performance    Gait belt applied - Yes    Bed Mobility:   Scooting anteriorly to EOB to have both feet planted on floor: minimum assistance  Supine to sit from right side of bed with minimum assistance and LE assist and VC for posterior hip precautions   Sit to Supine with maximal assistance and of 2 persons on right side of bed    Functional Mobility/Transfers:  Functional Mobility: The patient was unable to stand 2* c/o dizziness and unable to keep eyes open.    Activities of Daily Living:  Feeding: contact guard assistance/SBA to drink water and Pedialyte from a cup. The patient spilled water on her gown and required assist to wipe water.  Upper Body Dressing: mod assist to doff wet gown and don dry gown in bed  Lower Body Dressing: dependence    Cognitive/Visual Perceptual:  Cognitive/Psychosocial Skills:    -     Oriented to: Person, Place, and Situation  -     Follows Commands/attention: Inattentive, Follows one-step commands, and Follows two-step commands  -     Communication: clear/fluent  -     Memory: No Deficits noted  -     Safety awareness/insight to disability: impaired  -     Mood/Affect/Coping skills/emotional control: sleepy  Visual/Perceptual:    -     unable to keep eyes open    Physical Exam:  Balance:    -     Sitting: stand by assistance and  contact guard assistance  Postural examination/scapula alignment:    -       No postural abnormalities identified  Skin integrity: left hip incision covered by dressing  Edema:  None noted  Sensation:    -       Intact  Dominant hand: Right  Upper Extremity Range of Motion:  -       Right Upper Extremity: WFL  -       Left Upper Extremity: WFL  Upper Extremity Strength:    -       Right Upper Extremity: WFL  -       Left Upper Extremity: WFL    AMPAC 6 Click ADL:  [facility] Total Score:      Treatment & Education:  Patient educated on role of OT, POC, and goals for therapy  Educated the patient re: Posterior hip precautions with handouts given. The patient was too sleepy to receive info.  Vital signs: in Bed /71, EOB /64      Patient left HOB elevated with all lines intact, call button in reach, and RN notified.    GOALS:   Multidisciplinary Problems       Occupational Therapy Goals          Problem: Occupational Therapy    Goal Priority Disciplines Outcome Interventions   Occupational Therapy Goal     OT, PT/OT Progressing    Description: Goals to be met by: 10/19/2024     Patient will increase functional independence with ADLs by performing:    UE Dressing with Modified [place].  LE Dressing with Stand-by Assistance and Assistive Devices as needed.  Grooming while standing at sink with Stand-by Assistance and Assistive Devices as needed.  Toileting from toilet with Modified [place], Set-up Assistance, and Assistive Devices as needed for hygiene and clothing management.   Supine to sit with Modified [place] and Supervision with  posterior hip precautions  Step transfer with Modified [place] and Supervision  Toilet transfer to toilet with Modified [place] and Supervision.  Upper extremity exercise program x10 reps per handout, with independence.  The patient will verbalize understanding of posterior hip prec                       History:     Past Medical History:   Diagnosis Date     Arthritis     Personal history of colonic polyps 10/18/2021    2 mm ployp in the cecum,2 mm polyp in the descending colon         Past Surgical History:   Procedure Laterality Date    APPENDECTOMY      HYSTERECTOMY         Time Tracking:     OT Date of Treatment: 10/16/24  OT Start Time: 1204  OT Stop Time: 1221  OT Total Time (min): 17 min    Billable Minutes: Evaluation 17 (with PT)  [id number]: 8 min (1 SC)    The patient was found seated on the EOB with PT. The patient was able to stand with min assist using a RW and 2 person assist. The patient side stepped with mod assist and Left leg buckling. The patient was dep to return to supine. The patient was educated re: Posterior hip precautions and need to use a pillow between her legs when rolling in bed. The patient will require reinforcement 2* drowsiness and the patient closing her eyes. The patient was left in bed with HOB elevated and pillow between legs, all lines intact and nurse notified.    MAR Quigley    10/16/2024

## 2024-10-16 NOTE — PLAN OF CARE
Problem: Physical Therapy  Goal: Physical Therapy Goal  Description: Goals to be met by: 10/30/24     Patient will increase functional independence with mobility by performing:    Supine to sit with Modified [place]  Sit to supine with Modified [place]  Rolling to Left and Right with Modified [place].  Sit to stand transfer with Modified [place]  Gait  x 20 feet with Stand-by Assistance using Rolling Walker.   Sitting at edge of bed 10 minutes with [place]  Stand for 10 minutes with Modified [place] using Rolling Walker  Lower extremity exercise program x10 reps per handout, with independence    Pt will benefit from skilled acute care PT to improve functional mobility, improve endurance, and reduce burden of care.    Outcome: Progressing

## 2024-10-17 VITALS
BODY MASS INDEX: 25.06 KG/M2 | HEART RATE: 79 BPM | SYSTOLIC BLOOD PRESSURE: 126 MMHG | RESPIRATION RATE: 18 BRPM | OXYGEN SATURATION: 95 % | TEMPERATURE: 98 F | WEIGHT: 146.81 LBS | DIASTOLIC BLOOD PRESSURE: 71 MMHG | HEIGHT: 64 IN

## 2024-10-17 PROCEDURE — 25000003 PHARM REV CODE 250: Performed by: ORTHOPAEDIC SURGERY

## 2024-10-17 PROCEDURE — 97530 THERAPEUTIC ACTIVITIES: CPT

## 2024-10-17 PROCEDURE — 97110 THERAPEUTIC EXERCISES: CPT

## 2024-10-17 PROCEDURE — 97535 SELF CARE MNGMENT TRAINING: CPT

## 2024-10-17 RX ADMIN — ACETAMINOPHEN 1000 MG: 500 TABLET ORAL at 12:10

## 2024-10-17 RX ADMIN — SENNOSIDES AND DOCUSATE SODIUM 1 TABLET: 50; 8.6 TABLET ORAL at 09:10

## 2024-10-17 RX ADMIN — METHOCARBAMOL TABLETS 750 MG: 750 TABLET, COATED ORAL at 09:10

## 2024-10-17 RX ADMIN — CELECOXIB 200 MG: 100 CAPSULE ORAL at 09:10

## 2024-10-17 RX ADMIN — ACETAMINOPHEN 1000 MG: 500 TABLET ORAL at 11:10

## 2024-10-17 RX ADMIN — MUPIROCIN 1 G: 20 OINTMENT TOPICAL at 09:10

## 2024-10-17 RX ADMIN — FAMOTIDINE 20 MG: 20 TABLET ORAL at 09:10

## 2024-10-17 RX ADMIN — ASPIRIN 81 MG: 81 TABLET, COATED ORAL at 09:10

## 2024-10-17 RX ADMIN — ACETAMINOPHEN 1000 MG: 500 TABLET ORAL at 05:10

## 2024-10-17 NOTE — DISCHARGE SUMMARY
[facility]  Orthopedics  Discharge Summary      Patient Name: Shraddha Rea  MRN: [id number]  Admission Date: 10/16/2024  Hospital Length of Stay: 0 days  Discharge Date and Time:  10/17/2024 8:41 AM  Attending Physician: Ten Melendez MD   Discharging Provider: Ten Melendez MD  Primary Care Provider: Jaswant Live MD    HPI: Left Hip OA    Procedure(s) (LRB):  ARTHROPLASTY, HIP, TOTAL, USING COMPUTER-ASSISTED NAVIGATION (Left)      Hospital Course    The patient is a 73 yo female who has been followed in clinic for left hip arthritis. It was determined she would benefit from surgery. The procedure, its risks, benefits, and potential complications were discussed in detail with the patient prior to surgery. Understanding of all topics was conveyed by the patient, and consent was given for surgery. The patient was admitted to [facility] on 10/16/2024 for left hip arthroplasty.    The procedure was tolerated well and she was sent to the post-operative recovery room in stable condition, where she also did well. Post-operative x-rays in the recovery room showed well-aligned components without evidence of complication or fracture. She was subsequently sent to his hospital room for postoperative management.  ?  Once on the floor herpostoperative course  was unremarkable and she did well. her diet was advanced which was tolerated. her pain was well controlled on multimodal pain medication; this was eventually transitioned to oral medication alone prior to discharge. She worked with Physical Therapy starting on POD#0 who recommended she be discharged home. Their dressing remained clean dry and intact throughout the hospital stay. She remained afebrile with stable vital signs throughout the stay. He/she was stable for discharge on POD#1.      Consults (From admission, onward)          Status Ordering Provider     Inpatient consult to Social Work  Once        Provider:  (Not yet assigned)     "Completed SIOBHAN TOM     Inpatient consult to Social Work/Case Management  Once        Provider:  (Not yet assigned)    Completed SIOBHAN TOM            Significant Diagnostic Studies: No pertinent studies.    Pending Diagnostic Studies:       None          There are no hospital problems to display for this patient.     Discharged Condition: good    Disposition: Home-Health Care [facility]    Follow Up:   Follow-up Information       [place] - Follow up.    Specialty: Home Health Services  Why: Home Health- you will be contacted to schedule day and time to come to home to begin services  Contact information:  [address]  [address]  [place] [address]  [phone]               [facility] Follow up.    Why: DME- call for questions or concerns regarding rolling walker  Contact information:  [address] [place] [address] [id number] [phone]                         Patient Instructions:      WALKER FOR HOME USE     Order Specific Question Answer Comments   Type of Walker: Adult (5'4"-6'6")    With wheels? Yes    Height: 5'4    Weight: 62.7 kg (138 lb 2.2 oz)    Length of need (1-99 months): 99    Please check all that apply: Walker will be used for gait training.      Activity as tolerated     Lifting restrictions   Order Comments: No strenuous exercise or lifting of > 10 lbs     Weight bearing as tolerated     No driving, operating heavy equipment or signing legal documents while taking pain medication     Call MD for:  temperature >100.4     Call MD for:  persistent nausea and vomiting     Call MD for:  severe uncontrolled pain     Call MD for:  difficulty breathing, headache or visual disturbances     Call MD for:  redness, tenderness, or signs of infection (pain, swelling, redness, odor or green/yellow discharge around incision site)     Call MD for:  hives     Call MD for:  persistent dizziness or light-headedness     Call MD for:  extreme fatigue "     Medications:  Reconciled Home Medications:      Medication List        START taking these medications      aspirin 81 MG EC tablet  Commonly known as: ECOTRIN  Take 1 tablet (81 mg total) by mouth 2 (two) times a day.     celecoxib 200 MG capsule  Commonly known as: CeleBREX  Take 1 capsule (200 mg total) by mouth 2 (two) times daily.     docusate sodium 100 MG capsule  Commonly known as: COLACE  Take 1 capsule (100 mg total) by mouth 2 (two) times daily.     methocarbamoL 500 MG Tab  Commonly known as: ROBAXIN  Take 1 tablet (500 mg total) by mouth 4 (four) times daily. for 10 days     oxyCODONE 5 MG immediate release tablet  Commonly known as: ROXICODONE  Take 1-2 tablets (5-10 mg total) by mouth every 4 (four) hours as needed (pain).            CHANGE how you take these medications      acetaminophen 500 MG tablet  Commonly known as: TYLENOL  Take 2 tablets (1,000 mg total) by mouth every 8 (eight) hours as needed for Pain.  What changed:   how much to take  when to take this            CONTINUE taking these medications      cetirizine 10 MG tablet  Commonly known as: ZYRTEC  Take 10 mg by mouth daily as needed for Allergies.     fluticasone propionate 50 mcg/actuation nasal spray  Commonly known as: FLONASE  1 spray by Each Nostril route as needed for Rhinitis.            STOP taking these medications      diclofenac 75 MG EC tablet  Commonly known as: VOLTAREN     diclofenac sodium 3 % gel  Commonly known as: ILEANA Melendez MD  Orthopedics  [facility] - Med Surg

## 2024-10-17 NOTE — PT/OT/SLP PROGRESS
Occupational Therapy   Treatment    Name: Shraddha Rea  MRN: [id number]  Admitting Diagnosis:  <principal problem not specified>  1 Day Post-Op    Recommendations:     Discharge Recommendations: Low Intensity Therapy  Discharge Equipment Recommendations:  walker, rolling  Barriers to discharge:  None    Assessment:     Shraddha Rea is a 74 y.o. female with a medical diagnosis of s/p left ROBER.  Performance deficits affecting function are impaired self care skills, impaired functional mobility, gait instability, impaired balance, impaired skin, orthopedic precautions, decreased lower extremity function, decreased safety awareness, decreased ROM. The patient was able to verbalize understanding of posterior hip precautions and home safety recommendations. The patient required min assist to move supine to sit and (S) to amb using a RW to the toilet and sink. The patient was educated re: LB dressing using AE and given handout for reference. The patient stated she is ready for D/C to home and will have assist from her daughter-in-law.     Rehab Prognosis:  Good; patient would benefit from acute skilled OT services to address these deficits and reach maximum level of function.       Plan:     Patient to be seen 5 x/week to address the above listed problems via self-care/home management, therapeutic activities, therapeutic exercises  Plan of Care Expires: 10/19/24  Plan of Care Reviewed with: patient    Subjective     Chief Complaint: none  Patient/Family Comments/goals: wants to go home today  Pain/Comfort:  Pain Rating 1: 0/10    Objective:     Communicated with: nurse prior to session.  Patient found HOB elevated with peripheral IV upon OT entry to room.    General Precautions: Standard, fall    Orthopedic Precautions:LLE posterior precautions, LLE weight bearing as tolerated  Braces: N/A  Respiratory Status: Room air     Occupational Performance:     Bed Mobility:    Patient completed Scooting/Bridging with  "stand by assistance  Patient completed Supine to Sit with contact guard assistance, minimum assistance, and with leg lift     Functional Mobility/Transfers:  Patient completed Sit <> Stand Transfer with supervision and stand by assistance  with  hand-held assist   Patient completed Toilet Transfer Step Transfer technique with supervision with  rolling walker  Functional Mobility: The patient amb using a RW to the toilet and sink with (S). The patient required VC for RW use and to not "twist" on her left leg.     Activities of Daily Living:  Grooming: supervision to stand at the sink using a RW to brush her teeth and to wash her hands and face  Lower Body Dressing: dependence to adjust B socks  Toileting: modified independence, supervision, and set up assist with BSC placed over the toilet      AMPAC 6 Click ADL: 20    Treatment & Education:  Performed self care and functional mobility as noted above  Reviewed posterior hip precautions. The patient was able to recall 4 of precautions  Educated the patient re: positioning in bed with use of a pillow between her legs to observe hip precautions  Educated the patient re: home safety. The patient reports making modifications to her son's house in preparation for D/C.  Discussed DME needs. The patient's RW was delivered to her room and the patient has a raised toilet seat, reacher and long shoe horn at home.   Educated the patient re: need to request nursing assist to amb to the bathroom or return to bed    Patient left up in chair with all lines intact, call button in reach, and nurseJanet notified    GOALS:   Multidisciplinary Problems       Occupational Therapy Goals          Problem: Occupational Therapy    Goal Priority Disciplines Outcome Interventions   Occupational Therapy Goal     OT, PT/OT Progressing    Description: Goals to be met by: 10/19/2024     Patient will increase functional independence with ADLs by performing:    UE Dressing with Modified " [place].  LE Dressing with Stand-by Assistance and Assistive Devices as needed.  Grooming while standing at sink with Stand-by Assistance and Assistive Devices as needed.  Toileting from toilet with Modified [place], Set-up Assistance, and Assistive Devices as needed for hygiene and clothing management.   Supine to sit with Modified [place] and Supervision with  posterior hip precautions  Step transfer with Modified [place] and Supervision  Toilet transfer to toilet with Modified [place] and Supervision.  Upper extremity exercise program x10 reps per handout, with independence.  The patient will verbalize understanding of posterior hip prec                       Time Tracking:     OT Date of Treatment: 10/17/24  OT Start Time: 1036  OT Stop Time: 1111  OT Total Time (min): 35 min    Billable Minutes:Self Care/Home Management 23  Therapeutic Activity 12  Total Time 35    OT/SKIP: OT          10/17/2024

## 2024-10-17 NOTE — PT/OT/SLP PROGRESS
Physical Therapy      Patient Name:  Shraddha Rea   MRN:  [id number]    Patient not seen this afternoon for PM tx secondary to (Pt sitting EOB, waiting on nursing assistance to help with dressing to go home. Son is on his way. Pt declined ambulation this afternoon, ready to go home. Pt expressed no further concerns or needs.).

## 2024-10-17 NOTE — PT/OT/SLP PROGRESS
"Physical Therapy Treatment    Patient Name:  Shraddha Rea   MRN:  [id number]    Recommendations:     Discharge Recommendations: Low Intensity Therapy  Discharge Equipment Recommendations: walker, rolling  Barriers to discharge:  None    Assessment:     Shraddha Rea is a 74 y.o. female admitted with a medical diagnosis of S/P L hip arthroplasty.  She presents with the following impairments/functional limitations: weakness, orthopedic precautions.    Rehab Prognosis: Good; patient would benefit from acute skilled PT services to address these deficits and reach maximum level of function.    Recent Surgery: Procedure(s) (LRB):  ARTHROPLASTY, HIP, TOTAL, USING COMPUTER-ASSISTED NAVIGATION (Left) 1 Day Post-Op    Plan:     During this hospitalization, patient to be seen BID to address the identified rehab impairments via gait training, therapeutic activities, therapeutic exercises, neuromuscular re-education and progress toward the following goals:    Plan of Care Expires:  10/30/24    Subjective     Chief Complaint: L hip stiffness  Patient/Family Comments/goals: Pt was agreeable for therapy. "I want to get back to [facility]"  Pain/Comfort:  Pain Rating 1: 0/10      Objective:     Patient found HOB elevated with B SCD, peripheral IV upon PT entry to room. Pt was much less groggy than yesterday. Pt cognition WNL. Pt able to attend to all tasks and activities. Pt was in a pleasant mood.    General Precautions: Standard, fall  Orthopedic Precautions: LLE posterior precautions, LLE weight bearing as tolerated  Braces: N/A  Respiratory Status: Room air     Functional Mobility:  Bed Mobility:     Rolling Right: supervision  Scooting: supervision  Supine to Sit: supervision  Transfers:     Sit to Stand:  contact guard assistance with rolling walker  Gait: Patient ambulated FWB/WBAT: bilateral lower extremity 300  feet on level tile with Rolling Walker with Supervision or Set-up Assistance.  Pt demonstrated a reciprocal " gait pattern with decreased jie, decreased velocity of limb motion, decreased step length, and decreased weight-shifting ability. Impairments contributing to gait deviations include decreased strength. Pt required Min VC to keep rolling walker on floor while ambulating.  Balance: Balance Training  Static Sitting/Standing:  Patient performed static sitting on  EOB   using  no AD  with Supervision and no verbal cues.   Dynamic Sitting/Standing:  Patient performed dynamic standing on level surface using rolling walker with Supervision and minimal verbal cues for adhering to hip precautions while turning during minimal excursions of ambulation trial.    AM-PAC 6 CLICK MOBILITY  Turning over in bed (including adjusting bedclothes, sheets and blankets)?: 4  Sitting down on and standing up from a chair with arms (e.g., wheelchair, bedside commode, etc.): 4  Moving from lying on back to sitting on the side of the bed?: 4  Moving to and from a bed to a chair (including a wheelchair)?: 4  Need to walk in hospital room?: 3  Climbing 3-5 steps with a railing?: 2  Basic Mobility Total Score: 21       Treatment & Education:  Pt completed the following exercises seated in chair to improve BLE strength:  2x10 reps ankle pumps  2x10 reps LAQs  2x10 reps glut sets  2x10 reps seated heel raises    Pt educated on posterior approach hip precautions for LLE (NO hip flexion >90 degrees, NO hip adduction past midline, and NO Hip IR.) Pt in possession of hip precaution handouts from [facility]. Pt verbalized understanding of posterior approach hip precautions.  Pt educated on benefits of exercise and activity to reduce muscle atrophy 2/2 prolonged hospital stays.  Pt educated on adhering to posterior approach hip precautions while performing car transfers. Pt verbalized understanding.  Pt given seated/supine LE exercise handout to be completed throughout the day.     Patient left up in chair with all lines intact, call button in reach,  and all personal items in reach. Nursing notified.    GOALS:   Multidisciplinary Problems       Physical Therapy Goals          Problem: Physical Therapy    Goal Priority Disciplines Outcome Interventions   Physical Therapy Goal     PT, PT/OT Progressing    Description: Goals to be met by: 10/30/24     Patient will increase functional independence with mobility by performing:    Supine to sit with Modified [place]  Sit to supine with Modified [place]  Rolling to Left and Right with Modified [place].  Sit to stand transfer with Modified [place]  Gait  x 50 feet with Stand-by Assistance using Rolling Walker.   Sitting at edge of bed 10 minutes with [place]  Stand for 10 minutes with Modified [place] using Rolling Walker  Lower extremity exercise program x10 reps per handout, with independence    Pt will benefit from skilled acute care PT to improve functional mobility, improve endurance, and reduce burden of care.                         Time Tracking:     PT Received On: 10/17/24  PT Start Time: 0911     PT Stop Time: 0934  PT Total Time (min): 23 min     Billable Minutes: Therapeutic Activity 14 and Therapeutic Exercise 9    Treatment Type: Treatment  PT/PTA: PT     Number of PTA visits since last PT visit: 0     10/17/2024

## 2024-10-17 NOTE — ANESTHESIA POSTPROCEDURE EVALUATION
Anesthesia Post Evaluation    Patient: Shraddha Rea    Procedure(s) Performed: Procedure(s) (LRB):  ARTHROPLASTY, HIP, TOTAL, USING COMPUTER-ASSISTED NAVIGATION (Left)    Final Anesthesia Type: spinal      Patient location during evaluation: PACU  Patient participation: Yes- Able to Participate  Level of consciousness: awake and alert, oriented and awake  Post-procedure vital signs: reviewed and stable  Airway patency: patent    PONV status at discharge: No PONV  Anesthetic complications: no      Cardiovascular status: blood pressure returned to baseline  Respiratory status: unassisted, spontaneous ventilation and room air  Hydration status: euvolemic  Follow-up not needed.              Vitals Value Taken Time   /66 10/17/24 0728   Temp 36.7 °C (98.1 °F) 10/17/24 0728   Pulse 76 10/17/24 0728   Resp 18 10/17/24 0728   SpO2 95 % 10/17/24 0728         Event Time   Out of Recovery 16:52:00         Pain/Thanh Score: Pain Rating Prior to Med Admin: 3 (10/17/2024  5:18 AM)  Pain Rating Post Med Admin: 0 (10/16/2024 12:28 PM)  Thanh Score: 9 (10/16/2024  4:00 PM)

## 2024-10-17 NOTE — NURSING
[facility] [place]  Nurses Note -- 4 Eyes      10/17/2024       Skin assessed on: Q Shift      [x] No Pressure Injuries Present    []Prevention Measures Documented    [] Yes LDA  for Pressure Injury Previously documented     [] Yes New Pressure Injury Discovered   [] LDA for New Pressure Injury Added      Attending RN:  Priscilla Moreland, RN     Second RN:  JUDI Mcclure

## 2024-10-17 NOTE — PLAN OF CARE
10/17/24 1017   Final Note   Assessment Type Final Discharge Note   Anticipated Discharge Disposition Home-Health   Hospital Resources/Appts/Education Provided Appointments scheduled and added to AVS;Post-Acute resouces added to AVS   Post-Acute Status   Post-Acute Authorization Home Health;HME   HME Status Set-up Complete/Auth obtained   Home Health Status Set-up Complete/Auth obtained     Pts nurse Janet notified that the pt can d/c from CM standpoint

## 2024-10-17 NOTE — NURSING
Rx delivered to bedside,transported downstairs via wheelchair with belongings & family at side, no distress noted,safety maintained.

## 2024-10-18 DIAGNOSIS — Z96.642 STATUS POST LEFT HIP REPLACEMENT: Primary | ICD-10-CM

## 2024-10-18 PROCEDURE — G0180 MD CERTIFICATION HHA PATIENT: HCPCS | Mod: ,,, | Performed by: ORTHOPAEDIC SURGERY

## 2024-10-23 DIAGNOSIS — Z96.642 STATUS POST LEFT HIP REPLACEMENT: Primary | ICD-10-CM

## 2024-10-31 RX ORDER — METHOCARBAMOL 500 MG/1
500 TABLET, FILM COATED ORAL 4 TIMES DAILY
Qty: 40 TABLET | Refills: 0 | Status: SHIPPED | OUTPATIENT
Start: 2024-10-31 | End: 2024-11-10

## 2024-11-07 ENCOUNTER — OFFICE VISIT (OUTPATIENT)
Dept: ORTHOPEDICS | Facility: CLINIC | Age: 74
End: 2024-11-07
Payer: MEDICARE

## 2024-11-07 ENCOUNTER — APPOINTMENT (OUTPATIENT)
Dept: RADIOLOGY | Facility: HOSPITAL | Age: 74
End: 2024-11-07
Attending: ORTHOPAEDIC SURGERY
Payer: MEDICARE

## 2024-11-07 VITALS — HEIGHT: 64 IN | WEIGHT: 146.81 LBS | BODY MASS INDEX: 25.06 KG/M2

## 2024-11-07 DIAGNOSIS — Z96.642 STATUS POST LEFT HIP REPLACEMENT: Primary | ICD-10-CM

## 2024-11-07 DIAGNOSIS — Z96.642 STATUS POST LEFT HIP REPLACEMENT: ICD-10-CM

## 2024-11-07 PROCEDURE — 1101F PT FALLS ASSESS-DOCD LE1/YR: CPT | Mod: CPTII,S$GLB,, | Performed by: ORTHOPAEDIC SURGERY

## 2024-11-07 PROCEDURE — 1125F AMNT PAIN NOTED PAIN PRSNT: CPT | Mod: CPTII,S$GLB,, | Performed by: ORTHOPAEDIC SURGERY

## 2024-11-07 PROCEDURE — 1159F MED LIST DOCD IN RCRD: CPT | Mod: CPTII,S$GLB,, | Performed by: ORTHOPAEDIC SURGERY

## 2024-11-07 PROCEDURE — 99999 PR PBB SHADOW E&M-EST. PATIENT-LVL III: CPT | Mod: PBBFAC,,, | Performed by: ORTHOPAEDIC SURGERY

## 2024-11-07 PROCEDURE — 3288F FALL RISK ASSESSMENT DOCD: CPT | Mod: CPTII,S$GLB,, | Performed by: ORTHOPAEDIC SURGERY

## 2024-11-07 PROCEDURE — 3044F HG A1C LEVEL LT 7.0%: CPT | Mod: CPTII,S$GLB,, | Performed by: ORTHOPAEDIC SURGERY

## 2024-11-07 PROCEDURE — 73502 X-RAY EXAM HIP UNI 2-3 VIEWS: CPT | Mod: TC,FY,PN,LT

## 2024-11-07 PROCEDURE — 99024 POSTOP FOLLOW-UP VISIT: CPT | Mod: S$GLB,,, | Performed by: ORTHOPAEDIC SURGERY

## 2024-11-07 PROCEDURE — 73502 X-RAY EXAM HIP UNI 2-3 VIEWS: CPT | Mod: 26,LT,, | Performed by: RADIOLOGY

## 2024-11-07 NOTE — PROGRESS NOTES
Ortho Hip Follow Up Note    PCP: Jaswant Live MD   Referring Provider: No referring provider defined for this encounter.     Assessment:  74 y.o. female status post left Total Hip Primary completed on 10/16/24.  Patient is doing really well.  Her preop pain is gone.  She has some lateral based incisional tenderness.  She is on a cane when ambulating outdoors.  She is working with therapy.  She is taking Tylenol only.  She reported for the 1st week she had some leg length discrepancy where a left felt longer than her right but that was pre-existing and is improving.    Plan:  Follow up 3 months   Future Radiographs Indicated at next visit: No    Pain Management: Continue current pain management  Anticoagulation: Continue ASA for total of 4 weeks duration post operatively  Wound Care: Ok to shower. No scrubbing incision. No soaking in pools or tubs for 6 weeks post operative.     Patient Reassurance:   Post-operative course discussed with patient. Patient reassured and supported. All questions answered.    ACTIVE PROBLEM LIST  Patient Active Problem List   Diagnosis   (none) - all problems resolved or deleted         HPI:  Shraddha Rea presents today for a post-op visit.    STATUS POST:  left Total Hip Primary  BMI: There is no height or weight on file to calculate BMI.    Post operative recovery was complicated by: None    Patient rates his condition as improving. Pleased with surgical outcome to date.     Functional Assessment:  Started Outpatient PT  Functional Difficulties:  Walking  Pain Medication:  Non-Narcotic  Anticoagulation: Aspirin     EXAM: POST OP HIP    left POST-OPERATIVE HIP    There were no vitals taken for this visit.    Skin:  No evidence of erythema, warmth, discharge, or drainage and Incision clean/dry/intact  Range of Motion: Pain Free  Neurovascular Status: Sensation intact in Sural, Saphenous, SPN, DPN and Tibial nerve distribution. 5 out of 5 strength in hip flexion, knee  flexion/extension, ankle plantarflexion/dorsiflexion. 2+ dorsalis pedis    Hip Imaging:  Implants are well fixed. There is no evidence of loosening. No dislocation.   Estimated Anteversion: 25  Estimate Abduction: 37  Estimated LLD: 2mm (based on tear drop)

## 2024-11-08 ENCOUNTER — EXTERNAL HOME HEALTH (OUTPATIENT)
Dept: HOME HEALTH SERVICES | Facility: HOSPITAL | Age: 74
End: 2024-11-08
Payer: MEDICARE

## 2024-11-27 ENCOUNTER — PATIENT MESSAGE (OUTPATIENT)
Dept: RESEARCH | Facility: HOSPITAL | Age: 74
End: 2024-11-27
Payer: MEDICARE

## 2024-12-06 ENCOUNTER — HOSPITAL ENCOUNTER (OUTPATIENT)
Dept: RADIOLOGY | Facility: HOSPITAL | Age: 74
Discharge: HOME | End: 2024-12-06
Attending: INTERNAL MEDICINE
Payer: MEDICARE

## 2024-12-06 DIAGNOSIS — Z12.31 BREAST CANCER SCREENING BY MAMMOGRAM: ICD-10-CM

## 2024-12-06 PROCEDURE — 77063 BREAST TOMOSYNTHESIS BI: CPT | Mod: TC

## 2025-01-06 ENCOUNTER — OFFICE VISIT (OUTPATIENT)
Dept: FAMILY MEDICINE | Facility: CLINIC | Age: 75
End: 2025-01-06
Payer: MEDICARE

## 2025-01-06 VITALS
TEMPERATURE: 98 F | BODY MASS INDEX: 24.35 KG/M2 | HEIGHT: 64 IN | OXYGEN SATURATION: 99 % | RESPIRATION RATE: 15 BRPM | SYSTOLIC BLOOD PRESSURE: 138 MMHG | DIASTOLIC BLOOD PRESSURE: 72 MMHG | HEART RATE: 97 BPM | WEIGHT: 142.63 LBS

## 2025-01-06 DIAGNOSIS — R01.1 SYSTOLIC MURMUR: ICD-10-CM

## 2025-01-06 DIAGNOSIS — R03.0 TRANSIENT ELEVATED BLOOD PRESSURE: Primary | ICD-10-CM

## 2025-01-06 PROCEDURE — 1126F AMNT PAIN NOTED NONE PRSNT: CPT | Mod: CPTII,S$GLB,, | Performed by: NURSE PRACTITIONER

## 2025-01-06 PROCEDURE — 1160F RVW MEDS BY RX/DR IN RCRD: CPT | Mod: CPTII,S$GLB,, | Performed by: NURSE PRACTITIONER

## 2025-01-06 PROCEDURE — 99213 OFFICE O/P EST LOW 20 MIN: CPT | Mod: S$GLB,,, | Performed by: NURSE PRACTITIONER

## 2025-01-06 PROCEDURE — 3288F FALL RISK ASSESSMENT DOCD: CPT | Mod: CPTII,S$GLB,, | Performed by: NURSE PRACTITIONER

## 2025-01-06 PROCEDURE — 3075F SYST BP GE 130 - 139MM HG: CPT | Mod: CPTII,S$GLB,, | Performed by: NURSE PRACTITIONER

## 2025-01-06 PROCEDURE — 99999 PR PBB SHADOW E&M-EST. PATIENT-LVL IV: CPT | Mod: PBBFAC,,, | Performed by: NURSE PRACTITIONER

## 2025-01-06 PROCEDURE — 3008F BODY MASS INDEX DOCD: CPT | Mod: CPTII,S$GLB,, | Performed by: NURSE PRACTITIONER

## 2025-01-06 PROCEDURE — 3078F DIAST BP <80 MM HG: CPT | Mod: CPTII,S$GLB,, | Performed by: NURSE PRACTITIONER

## 2025-01-06 PROCEDURE — 1101F PT FALLS ASSESS-DOCD LE1/YR: CPT | Mod: CPTII,S$GLB,, | Performed by: NURSE PRACTITIONER

## 2025-01-06 PROCEDURE — 1159F MED LIST DOCD IN RCRD: CPT | Mod: CPTII,S$GLB,, | Performed by: NURSE PRACTITIONER

## 2025-01-06 NOTE — PROGRESS NOTES
Routine Office Visit    Patient Name: Shraddha Rea    : 1950  MRN: [id number]    Chief Complaint:  Follow up BP    Subjective:  Shraddha is a 74 y.o. female who presents today for:    Follow up BP - patient who is new to me status post recent left hip replacement reports today for evaluation.  She is doing well after her hip replacement and intends to restart an exercise regimen soon.  She notes history of white coat hypertension but states her BP usually improved throughout the visit.  Initially was elevated but did improve to 138/72 today.  She denies any chest pain, shortness of breath, orthopnea, or leg swelling today.  She does not take any daily medications.    Past Medical History  Past Medical History:   Diagnosis Date    Arthritis     Personal history of colonic polyps 10/18/2021    2 mm ployp in the cecum,2 mm polyp in the descending colon       Family History  Family History   Problem Relation Name Age of Onset    Breast cancer Mother  60    Lung cancer Sister      Lupus Sister      Cancer Maternal Grandmother      Breast cancer Maternal Cousin         Current Medications  Current Outpatient Medications on File Prior to Visit   Medication Sig Dispense Refill    acetaminophen (TYLENOL) 500 MG tablet Take 2 tablets (1,000 mg total) by mouth every 8 (eight) hours as needed for Pain. 42 tablet 0    celecoxib (CELEBREX) 200 MG capsule Take 1 capsule (200 mg total) by mouth 2 (two) times daily. 14 capsule 0    cetirizine (ZYRTEC) 10 MG tablet Take 10 mg by mouth daily as needed for Allergies.      docusate sodium (COLACE) 100 MG capsule Take 1 capsule (100 mg total) by mouth 2 (two) times daily. 30 capsule 0    fluticasone propionate (FLONASE) 50 mcg/actuation nasal spray 1 spray by Each Nostril route as needed for Rhinitis.      oxyCODONE (ROXICODONE) 5 MG immediate release tablet Take 1-2 tablets (5-10 mg total) by mouth every 4 (four) hours as needed (pain). 40 tablet 0    aspirin (ECOTRIN) 81 MG  "EC tablet Take 1 tablet (81 mg total) by mouth 2 (two) times a day. 60 tablet 0     No current facility-administered medications on file prior to visit.       Allergies   Review of patient's allergies indicates:   Allergen Reactions    Mold Shortness Of Breath    Aspirin     Grass pollen-june grass standard Itching    Sulfa (sulfonamide antibiotics)     Sulfamethoxazole Other (See Comments)    Amoxicillin Nausea Only and Other (See Comments)     Pt states ---just felt worse after use    Milk containing products (dairy) Nausea And Vomiting and Other (See Comments)       Review of Systems (Pertinent positives)  Review of Systems   Constitutional: Negative.  Negative for chills and fever.   HENT: Negative.  Negative for congestion, hearing loss, sinus pain and sore throat.    Eyes: Negative.  Negative for discharge.   Respiratory:  Negative for cough, shortness of breath and wheezing.    Cardiovascular:  Negative for chest pain, palpitations, orthopnea and claudication.   Gastrointestinal: Negative.  Negative for abdominal pain, blood in stool, constipation, diarrhea, nausea and vomiting.   Genitourinary: Negative.  Negative for dysuria, frequency, hematuria and urgency.   Musculoskeletal: Negative.  Negative for back pain, joint pain and neck pain.   Skin: Negative.    Neurological: Negative.  Negative for dizziness, tingling, loss of consciousness, weakness and headaches.   Endo/Heme/Allergies: Negative.  Negative for polydipsia.   Psychiatric/Behavioral: Negative.         /72 (BP Location: Left arm, Patient Position: Sitting)   Pulse 97   Temp 98.2 °F (36.8 °C) (Oral)   Resp 15   Ht 5' 4" (1.626 m)   Wt 64.7 kg (142 lb 10.2 oz)   SpO2 99%   BMI 24.48 kg/m²     Physical Exam  Vitals reviewed.   Constitutional:       General: She is not in acute distress.     Appearance: Normal appearance. She is not ill-appearing, toxic-appearing or diaphoretic.   HENT:      Head: Normocephalic and atraumatic. "   Cardiovascular:      Rate and Rhythm: Normal rate and regular rhythm.      Pulses: Normal pulses.      Heart sounds: Murmur heard.      Systolic murmur is present with a grade of 2/6.      No S3 or S4 sounds.   Pulmonary:      Effort: Pulmonary effort is normal. No respiratory distress.      Breath sounds: Normal breath sounds. No wheezing.   Musculoskeletal:         General: No swelling, tenderness or deformity. Normal range of motion.      Right lower leg: No edema.      Left lower leg: No edema.   Skin:     General: Skin is warm and dry.      Capillary Refill: Capillary refill takes less than 2 seconds.   Neurological:      General: No focal deficit present.      Mental Status: She is alert and oriented to person, place, and time.   Psychiatric:         Mood and Affect: Mood normal.         Behavior: Behavior normal.            Assessment/Plan:  Shraddha Rea is a 74 y.o. female who presents today for :    Shraddha was seen today for follow-up.    Diagnoses and all orders for this visit:    Transient elevated blood pressure    Improved with recheck.    Systolic murmur  -     Echo; Future    Reviewed most recent EKG reading.  Patient states she did have an echocardiogram done 5 years ago at [facility].  She states there was nothing compelling found.  Will recheck.  Can refer to Cardiology if patient becomes symptomatic.        This office note has been dictated.  This dictation has been generated using M-Modal Fluency Direct dictation; some phonetic errors may occur.

## 2025-01-10 ENCOUNTER — HOSPITAL ENCOUNTER (OUTPATIENT)
Dept: CARDIOLOGY | Facility: HOSPITAL | Age: 75
Discharge: HOME | End: 2025-01-10
Attending: NURSE PRACTITIONER
Payer: MEDICARE

## 2025-01-10 VITALS
BODY MASS INDEX: 24.1 KG/M2 | WEIGHT: 141.13 LBS | HEART RATE: 95 BPM | SYSTOLIC BLOOD PRESSURE: 138 MMHG | DIASTOLIC BLOOD PRESSURE: 72 MMHG | HEIGHT: 64 IN

## 2025-01-10 DIAGNOSIS — R01.1 SYSTOLIC MURMUR: ICD-10-CM

## 2025-01-10 LAB
ASCENDING AORTA: 3.15 CM
AV AREA BY CONTINUOUS VTI: 3.1 CM2
AV INDEX (PROSTH): 0.77
AV LVOT MEAN GRADIENT: 5 MMHG
AV LVOT PEAK GRADIENT: 9 MMHG
AV MEAN GRADIENT: 8.5 MMHG
AV PEAK GRADIENT: 14.4 MMHG
AV VALVE AREA BY VELOCITY RATIO: 3.3 CM²
AV VALVE AREA: 3.2 CM2
AV VELOCITY RATIO: 0.79
BSA FOR ECHO PROCEDURE: 1.7 M2
CV ECHO LV RWT: 0.45 CM
DOP CALC AO PEAK VEL: 1.9 M/S
DOP CALC AO VTI: 37.6 CM
DOP CALC LVOT AREA: 4.2 CM2
DOP CALC LVOT DIAMETER: 2.3 CM
DOP CALC LVOT PEAK VEL: 1.5 M/S
DOP CALC LVOT STROKE VOLUME: 120.8 CM3
DOP CALCLVOT PEAK VEL VTI: 29.1 CM
E WAVE DECELERATION TIME: 243.62 MS
E/A RATIO: 0.59
E/E' RATIO: 9.85 M/S
ECHO EF ESTIMATED: 66 %
ECHO LV POSTERIOR WALL: 0.9 CM (ref 0.6–1.1)
FRACTIONAL SHORTENING: 35 % (ref 28–44)
INTERVENTRICULAR SEPTUM: 0.7 CM (ref 0.6–1.1)
IVC DIAMETER: 1.22 CM
IVRT: 144.62 MS
LA MAJOR: 5.34 CM
LA MINOR: 5.06 CM
LA WIDTH: 4.02 CM
LEFT ATRIUM SIZE: 3.98 CM
LEFT ATRIUM VOLUME INDEX MOD: 35 ML/M2
LEFT ATRIUM VOLUME INDEX: 42.1 ML/M2
LEFT ATRIUM VOLUME MOD: 58.78 ML
LEFT ATRIUM VOLUME: 70.67 CM3
LEFT INTERNAL DIMENSION IN SYSTOLE: 2.6 CM (ref 2.1–4)
LEFT VENTRICLE DIASTOLIC VOLUME INDEX: 41.26 ML/M2
LEFT VENTRICLE DIASTOLIC VOLUME: 69.32 ML
LEFT VENTRICLE MASS INDEX: 55.6 G/M2
LEFT VENTRICLE SYSTOLIC VOLUME INDEX: 14.1 ML/M2
LEFT VENTRICLE SYSTOLIC VOLUME: 23.75 ML
LEFT VENTRICULAR INTERNAL DIMENSION IN DIASTOLE: 4 CM (ref 3.5–6)
LEFT VENTRICULAR MASS: 93.5 G
LV LATERAL E/E' RATIO: 8
LV SEPTAL E/E' RATIO: 12.8
MV A" WAVE DURATION": 154.14 MS
MV PEAK A VEL: 1.08 M/S
MV PEAK E VEL: 0.64 M/S
OHS CV RV/LV RATIO: 0.9 CM
PISA TR MAX VEL: 2.72 M/S
PULM VEIN A" WAVE DURATION": 154.14 MS
PULM VEIN S/D RATIO: 2.27
PULMONIC VEIN PEAK A VELOCITY: 0.3 M/S
PV PEAK D VEL: 0.26 M/S
PV PEAK S VEL: 0.59 M/S
RA MAJOR: 4.37 CM
RA PRESSURE ESTIMATED: 3 MMHG
RA WIDTH: 3.21 CM
RIGHT ATRIAL AREA: 10.9 CM2
RIGHT VENTRICLE DIASTOLIC BASEL DIMENSION: 3.6 CM
RV TB RVSP: 6 MMHG
RV TISSUE DOPPLER FREE WALL SYSTOLIC VELOCITY 1 (APICAL 4 CHAMBER VIEW): 14.52 CM/S
SINUS: 3.13 CM
STJ: 2.95 CM
TDI LATERAL: 0.08 M/S
TDI SEPTAL: 0.05 M/S
TDI: 0.07 M/S
TR MAX PG: 30 MMHG
TRICUSPID ANNULAR PLANE SYSTOLIC EXCURSION: 2.08 CM
TV PEAK GRADIENT: 30 MMHG
TV REST PULMONARY ARTERY PRESSURE: 33 MMHG
Z-SCORE OF LEFT VENTRICULAR DIMENSION IN END DIASTOLE: -1.59
Z-SCORE OF LEFT VENTRICULAR DIMENSION IN END SYSTOLE: -0.87

## 2025-01-10 PROCEDURE — 93306 TTE W/DOPPLER COMPLETE: CPT

## 2025-01-10 PROCEDURE — 93306 TTE W/DOPPLER COMPLETE: CPT | Mod: 26,,, | Performed by: STUDENT IN AN ORGANIZED HEALTH CARE EDUCATION/TRAINING PROGRAM

## 2025-01-14 ENCOUNTER — TELEPHONE (OUTPATIENT)
Dept: FAMILY MEDICINE | Facility: CLINIC | Age: 75
End: 2025-01-14
Payer: MEDICARE

## 2025-01-14 NOTE — TELEPHONE ENCOUNTER
Patient called and notified of echocardiogram results.  Regurgitation noted on a few valves.  Patient is completely asymptomatic and is doing [employer] classes without any chest pain or shortness for breath.  PA pressure elevated.  We discussed potentially repeating in 6 months or getting an appointment with Cardiology.  Patient like to defer at this time but we can consider this echocardiogram in the future if needed.  Recommended patient to let me know if she develops any exertional symptoms like chest pain, palpitations, shortness for breath etc..  All questions answered

## 2025-02-14 ENCOUNTER — OFFICE VISIT (OUTPATIENT)
Dept: ORTHOPEDICS | Facility: CLINIC | Age: 75
End: 2025-02-14
Payer: MEDICARE

## 2025-02-14 VITALS — WEIGHT: 141.13 LBS | HEIGHT: 63 IN | BODY MASS INDEX: 25.01 KG/M2

## 2025-02-14 DIAGNOSIS — Z96.642 STATUS POST LEFT HIP REPLACEMENT: Primary | ICD-10-CM

## 2025-02-14 PROCEDURE — 99999 PR PBB SHADOW E&M-EST. PATIENT-LVL III: CPT | Mod: PBBFAC,,, | Performed by: ORTHOPAEDIC SURGERY

## 2025-02-14 NOTE — PROGRESS NOTES
Ortho Hip Follow Up Note    PCP: Jaswant Live MD   Referring Provider: No referring provider defined for this encounter.     Assessment:  74 y.o. female status post left Total Hip Primary completed on 10/16/24.  Patient is doing really well.  Her preop pain is gone.  She has some medial hip pain that appears to be in her adductor musculature.  No assistive devices. Completed PT. Is about to restart [employer] classes.     Plan:  Follow up 12 months  Future Radiographs Indicated at next visit: Yes    Pain Management: OTC  Anticoagulation: None  Wound Care: None    Patient Reassurance:   Post-operative course discussed with patient. Patient reassured and supported. All questions answered.    ACTIVE PROBLEM LIST  Patient Active Problem List   Diagnosis   (none) - all problems resolved or deleted         HPI:  Shraddha Rea presents today for a post-op visit.    STATUS POST:  left Total Hip Primary  BMI: There is no height or weight on file to calculate BMI.    Post operative recovery was complicated by: None    Patient rates his condition as improving. Pleased with surgical outcome to date.     Functional Assessment:  Completed PT  Functional Difficulties:  Returning to exercise  Pain Medication:  Non-Narcotic  Anticoagulation: None    EXAM: POST OP HIP    left POST-OPERATIVE HIP    There were no vitals taken for this visit.    Skin:  No evidence of erythema, warmth, discharge, or drainage and Incision clean/dry/intact  Range of Motion: Pain Free  TTP over adductor tendon  Neurovascular Status: Sensation intact in Sural, Saphenous, SPN, DPN and Tibial nerve distribution. 5 out of 5 strength in hip flexion, knee flexion/extension, ankle plantarflexion/dorsiflexion. 2+ dorsalis pedis    Hip Imaging:  Implants are well fixed. There is no evidence of loosening. No dislocation.   Estimated Anteversion: 25  Estimate Abduction: 37  Estimated LLD: 2mm (based on tear drop)

## 2025-06-25 ENCOUNTER — TELEPHONE (OUTPATIENT)
Dept: ORTHOPEDICS | Facility: CLINIC | Age: 75
End: 2025-06-25
Payer: MEDICARE

## 2025-06-25 ENCOUNTER — PATIENT MESSAGE (OUTPATIENT)
Dept: ORTHOPEDICS | Facility: CLINIC | Age: 75
End: 2025-06-25
Payer: MEDICARE

## 2025-06-25 NOTE — TELEPHONE ENCOUNTER
I called patient to let her know that Dr. Melendez would be happy to treat her hip pain but he doesn't treat fingers  but she can see our PA chris if she would like. Portal message sent as well in regards to matter.

## 2025-07-14 ENCOUNTER — OFFICE VISIT (OUTPATIENT)
Dept: FAMILY MEDICINE | Facility: CLINIC | Age: 75
End: 2025-07-14
Payer: MEDICARE

## 2025-07-14 VITALS
HEART RATE: 79 BPM | WEIGHT: 136.88 LBS | DIASTOLIC BLOOD PRESSURE: 70 MMHG | OXYGEN SATURATION: 96 % | HEIGHT: 63 IN | TEMPERATURE: 98 F | SYSTOLIC BLOOD PRESSURE: 128 MMHG | BODY MASS INDEX: 24.25 KG/M2

## 2025-07-14 DIAGNOSIS — M65.342 TRIGGER RING FINGER OF LEFT HAND: Primary | ICD-10-CM

## 2025-07-14 DIAGNOSIS — I36.1 NONRHEUMATIC TRICUSPID VALVE REGURGITATION: ICD-10-CM

## 2025-07-14 DIAGNOSIS — Z71.84 TRAVEL ADVICE ENCOUNTER: ICD-10-CM

## 2025-07-14 DIAGNOSIS — Z00.00 HEALTHCARE MAINTENANCE: ICD-10-CM

## 2025-07-14 DIAGNOSIS — J30.2 SEASONAL ALLERGIES: ICD-10-CM

## 2025-07-14 PROCEDURE — 1101F PT FALLS ASSESS-DOCD LE1/YR: CPT | Mod: CPTII,S$GLB,, | Performed by: INTERNAL MEDICINE

## 2025-07-14 PROCEDURE — 1125F AMNT PAIN NOTED PAIN PRSNT: CPT | Mod: CPTII,S$GLB,, | Performed by: INTERNAL MEDICINE

## 2025-07-14 PROCEDURE — 99999 PR PBB SHADOW E&M-EST. PATIENT-LVL V: CPT | Mod: PBBFAC,,, | Performed by: INTERNAL MEDICINE

## 2025-07-14 PROCEDURE — 1159F MED LIST DOCD IN RCRD: CPT | Mod: CPTII,S$GLB,, | Performed by: INTERNAL MEDICINE

## 2025-07-14 PROCEDURE — 3074F SYST BP LT 130 MM HG: CPT | Mod: CPTII,S$GLB,, | Performed by: INTERNAL MEDICINE

## 2025-07-14 PROCEDURE — 3078F DIAST BP <80 MM HG: CPT | Mod: CPTII,S$GLB,, | Performed by: INTERNAL MEDICINE

## 2025-07-14 PROCEDURE — 3008F BODY MASS INDEX DOCD: CPT | Mod: CPTII,S$GLB,, | Performed by: INTERNAL MEDICINE

## 2025-07-14 PROCEDURE — 99214 OFFICE O/P EST MOD 30 MIN: CPT | Mod: S$GLB,,, | Performed by: INTERNAL MEDICINE

## 2025-07-14 PROCEDURE — 1160F RVW MEDS BY RX/DR IN RCRD: CPT | Mod: CPTII,S$GLB,, | Performed by: INTERNAL MEDICINE

## 2025-07-14 PROCEDURE — 3288F FALL RISK ASSESSMENT DOCD: CPT | Mod: CPTII,S$GLB,, | Performed by: INTERNAL MEDICINE

## 2025-07-14 RX ORDER — FEXOFENADINE HCL 180 MG/1
180 TABLET ORAL DAILY
Start: 2025-07-14 | End: 2026-07-14

## 2025-07-14 NOTE — PROGRESS NOTES
HISTORY OF PRESENT ILLNESS:  Shraddha Rea is a 74 y.o. female who presents to the clinic today for Follow-up,Travel Advice.  .     Last seen by me 9/3/25.    She presents for pre-travel consultation regarding upcoming two-week trip to [facility], visiting major tourist sites including [facility], [facility], [place], and [place]. She requires yellow fever vaccination as recommended by travel agency and tetanus vaccination, noting her last dose was approximately ten years ago. She will be traveling with a guided tour group.    She presents with trigger finger involving the left hand ring finger with soreness and pain. Symptoms have been present for several weeks with less frequent locking compared to a few weeks ago. This is a recurrence of a similar condition from approximately 3 years ago, which previously resolved after about a month. She has been managing symptoms by massaging the finger and using compression gloves, which provide occasional relief. She believes the condition may have been initiated by hitting her hand while breaking down boxes in the garage. She is right-handed and currently the finger is improving with self-massage. She denies significant functional impairment or complete finger locking.    She reports attempting to obtain RSV vaccination but was informed she does not qualify despite being 74 years old. She attempted to schedule vaccination online at [facility] but was unsuccessful due to perceived ineligibility.    She reports a known heart murmur, which was evaluated by nurse practitioner with an echocardiogram performed in January 2025. She currently denies experiencing shortness of breath, lightheadedness, or chest discomfort associated with the heart murmur.    She underwent left hip replacement surgery in October. Prior to surgery, she had stopped participating in [employer] in June of last year and yoga and core exercises in March of last year due to arthritis. Currently, she has returned to [facility] for one  "month and reports left hip feels great and more flexible. She describes right hip arthritis as an "octopus" that reaches out and grabs muscles, causing significant discomfort. Post-operatively, she only took Tylenol and declined oxycodone for pain management. At this point, she reports no pain and has resumed normal activities including cutting grass for six to eight weeks and returning to [facility].    She reports significant postnasal drip triggered by outdoor exposure to weeds, grass, and mold. Symptoms are particularly exacerbated during rainy weather. She previously used Zyrtec, which stopped working, and subsequently switched to Allegra. Currently taking Allegra daily with Flonase, which helps somewhat, but she is frustrated with daily medication requirement. She reports getting overwhelmed at events like [employer] due to allergy symptoms. She had allergy injections years ago before the pandemic at [facility], but did not notice significant improvement. She recently experienced a mild sinus infection lasting a few days. She becomes symptomatic with minimal outdoor exposure, even walking from house to car. She expresses desire to explore alter  native management strategies for persistent allergic symptoms.    ROS:  General: -fever, -chills, -fatigue, -weight gain, -weight loss  Eyes: -vision changes, -redness, -discharge  ENT: -ear pain, +nasal congestion, -sore throat, +post nasal drip, +sinus pressure, +nasal discharge  Cardiovascular: -chest pain, -palpitations, -lower extremity edema  Respiratory: -cough, -shortness of breath  Gastrointestinal: -abdominal pain, -nausea, -vomiting, -diarrhea, -constipation, -blood in stool  Genitourinary: -dysuria, -hematuria, -frequency  Musculoskeletal: +joint pain, -muscle pain, +pain with movement, +lower extremity pain with movement, +muscle tension  Skin: -rash, -lesion  Neurological: -headache, -dizziness, -numbness, -tingling  Psychiatric: -anxiety, -depression, -sleep " difficulty  Allergic: +allergic reactions             PAST MEDICAL HISTORY:  Past Medical History:   Diagnosis Date    Arthritis     Personal history of colonic polyps 10/18/2021    2 mm ployp in the cecum,2 mm polyp in the descending colon       PAST SURGICAL HISTORY:  Past Surgical History:   Procedure Laterality Date    APPENDECTOMY      HYSTERECTOMY      TOTAL REPLACEMENT OF HIP JOINT USING COMPUTER-ASSISTED NAVIGATION Left 10/16/2024    Procedure: ARTHROPLASTY, HIP, TOTAL, USING COMPUTER-ASSISTED NAVIGATION;  Surgeon: Ten Melendez MD;  Location: [facility];  Service: Orthopedics;  Laterality: Left;  Same Day. TVDeckPOONAM SOUSA FIRST ASSIST ALEXANDER NOTIFIED-GG  RN PREOP 10/02/2024, TYPE & SCREEN 10/14/2024-done------CLEARED BY PCP---NEED H/P       SOCIAL HISTORY:  Social History[1]    FAMILY HISTORY:  Family History   Problem Relation Name Age of Onset    Breast cancer Mother  60    Lung cancer Sister      Lupus Sister      Cancer Maternal Grandmother      Breast cancer Maternal Cousin         ALLERGIES AND MEDICATIONS: updated and reviewed.  Review of patient's allergies indicates:   Allergen Reactions    Mold Shortness Of Breath    Aspirin     Grass pollen-kiran grass standard Itching    Sulfa (sulfonamide antibiotics)     Sulfamethoxazole Other (See Comments)    Amoxicillin Nausea Only and Other (See Comments)     Pt states ---just felt worse after use    Milk containing products (dairy) Nausea And Vomiting and Other (See Comments)     Medication List with Changes/Refills   New Medications    FEXOFENADINE (ALLEGRA) 180 MG TABLET    Take 1 tablet (180 mg total) by mouth once daily.   Current Medications    ACETAMINOPHEN (TYLENOL) 500 MG TABLET    Take 2 tablets (1,000 mg total) by mouth every 8 (eight) hours as needed for Pain.    FLUTICASONE PROPIONATE (FLONASE) 50 MCG/ACTUATION NASAL SPRAY    1 spray by Each Nostril route as needed for Rhinitis.   Discontinued Medications    ASPIRIN (ECOTRIN) 81 MG EC  "TABLET    Take 1 tablet (81 mg total) by mouth 2 (two) times a day.    CELECOXIB (CELEBREX) 200 MG CAPSULE    Take 1 capsule (200 mg total) by mouth 2 (two) times daily.    CETIRIZINE (ZYRTEC) 10 MG TABLET    Take 10 mg by mouth daily as needed for Allergies.    DOCUSATE SODIUM (COLACE) 100 MG CAPSULE    Take 1 capsule (100 mg total) by mouth 2 (two) times daily.    OXYCODONE (ROXICODONE) 5 MG IMMEDIATE RELEASE TABLET    Take 1-2 tablets (5-10 mg total) by mouth every 4 (four) hours as needed (pain).          CARE TEAM:  Patient Care Team:  Jaswant Live MD as PCP - General (Internal Medicine)  [facility], [place], operated by [facility] (Gastroenterology)         PHYSICAL EXAM:   Vitals:    07/14/25 0909   BP: 128/70   Pulse: 79   Temp: 97.6 °F (36.4 °C)     Weight: 62.1 kg (136 lb 14.5 oz)   Height: 5' 3" (160 cm)   Body mass index is 24.25 kg/m².    Physical Exam    General: No acute distress. Well-developed. Well-nourished.  Eyes: EOMI. Sclerae anicteric.  HENT: Normocephalic. Atraumatic. Nares patent. Moist oral mucosa.  Ears: Bilateral TMs clear. Bilateral EACs clear.  Cardiovascular: Regular rate. Regular rhythm. Heart murmur present. No rubs. No gallops. Normal S1, S2.  Respiratory: Normal respiratory effort. Clear to auscultation bilaterally. No rales. No rhonchi. No wheezing.  Abdomen: Soft. Non-tender. Non-distended. Normoactive bowel sounds.  Musculoskeletal: No  obvious deformity.  Extremities: No lower extremity edema.  Neurological: Alert & oriented x3. No slurred speech. Normal gait.  Psychiatric: Normal mood. Normal affect. Good insight. Good judgment.  Skin: Warm. Dry. No rash.  MSK: Hand/Wrist - Left: Trigger finger in left ring finger.             ASSESSMENT AND PLAN:  Trigger ring finger of left hand  -     Ambulatory referral/consult to Hand Surgery; Future; Expected date: 07/21/2025    Seasonal allergies  -     fexofenadine (ALLEGRA) 180 MG tablet; " Take 1 tablet (180 mg total) by mouth once daily.    Healthcare maintenance  -     Hemoglobin A1C; Future; Expected date: 07/14/2025  -     Comprehensive Metabolic Panel; Future; Expected date: 07/14/2025  -     Lipid Panel; Future; Expected date: 07/14/2025  -     CBC Auto Differential; Future; Expected date: 07/14/2025  -     TSH; Future; Expected date: 07/14/2025  -     Vitamin D; Future; Expected date: 07/14/2025    Travel advice encounter  -     Ambulatory referral/consult to Travel Clinic; Future; Expected date: 07/21/2025    Nonrheumatic tricuspid valve regurgitation         - Shraddha's left ring finger soreness has improved with less locking than a few weeks ago.  - Massaging the finger appears to help alleviate symptoms.  - Examination revealed no major joint deformities, though possible mild arthritis was noted.  - Referred to hand clinic for further evaluation and potential corticosteroid injection.    - Monitored moderate tricuspid regurgitation along with mild mitral and aortic valve regurgitation noted on recent echocardiogram.  - Results showed normal left ventricle size, ventricular mass, wall thickness, wall motion, systolic function (EF 60-65%), and diastolic function.  - No immediate intervention required, but will consider repeat echocardiogram and cardiologist referral if symptoms develop.  - Shraddha advised to monitor for and report any dyspnea, lightheadedness, or chest discomfort.    - Monitored right hip arthritis causing muscle tightness.  - Shraddha has been receiving massage therapy since 2018 for symptom management with good results.  - Condition has improved as evidenced by resumed physical activities including [employer] and walking, indicating better hip flexibility and strength.  - Recommend continuing current physical activity levels as tolerated and maintaining massage therapy for ongoing arthritis management.    - Monitored postnasal drip and exposure to allergens including weeds, grass,  and mold.  - Current regimen of Allegra and Flonase is helping manage symptoms, though patient reports fatigue with daily medication use.  - Advised wearing a mask during yard work to reduce allergen exposure.  - Recommend continuing current medication regimen for symptom control.  - Discussed potential allergist referral if symptoms worsen despite current management.  - Shraddha instructed to contact office if allergy symptoms intensify.    - Left hip replacement has resulted in excellent outcomes with increased flexibility.  - Shraddha has successfully resumed physical activities including [employer] and walking, demonstrating improved hip function.  - Recommend continuing current exercise regimen as part of post-hip replacement recovery and ongoing monitoring of hip function.    - Shraddha is eligible for RSV vaccination based on age and risk factors.  - Referred to travel clinic for comprehensive pre-travel health assessment and vaccinations including tetanus and yellow fever prior to upcoming [place] trip.    - Ordered annual fasting labs.  - Shraddha to follow up in 6 months, preferably on a Monday in December or sooner as needed.    This note was generated with the assistance of ambient listening technology. Verbal consent was obtained by the patient and accompanying visitor(s) for the recording of patient appointment to facilitate this note. I attest to having reviewed and edited the generated note for accuracy, though some syntax or spelling errors may persist. Please contact the author of this note for any clarification.         [1]   Social History  Socioeconomic History    Marital status:     Number of children: 2   Tobacco Use    Smoking status: Never     Passive exposure: Never    Smokeless tobacco: Never   Substance and Sexual Activity    Alcohol use: Not Currently     Comment: occasional social    Drug use: Never    Sexual activity: Not Currently     Social Drivers of Health     Financial Resource Strain:  Low Risk  (10/16/2024)    Overall Financial Resource Strain (CARDIA)     Difficulty of Paying Living Expenses: Not hard at all   Food Insecurity: No Food Insecurity (10/16/2024)    Hunger Vital Sign     Worried About Running Out of Food in the Last Year: Never true     Ran Out of Food in the Last Year: Never true   Transportation Needs: No Transportation Needs (10/16/2024)    TRANSPORTATION NEEDS     Transportation : No   Physical Activity: Insufficiently Active (7/10/2024)    Exercise Vital Sign     Days of Exercise per Week: 2 days     Minutes of Exercise per Session: 60 min   Stress: No Stress Concern Present (10/16/2024)    [employer] - Occupational Stress Questionnaire     Feeling of Stress : Not at all   Housing Stability: Unknown (10/16/2024)    Housing Stability Vital Sign     Unable to Pay for Housing in the Last Year: No     Homeless in the Last Year: No

## 2025-07-21 ENCOUNTER — HOSPITAL ENCOUNTER (OUTPATIENT)
Dept: RADIOLOGY | Facility: HOSPITAL | Age: 75
Discharge: HOME | End: 2025-07-21
Payer: MEDICARE

## 2025-07-21 ENCOUNTER — PATIENT MESSAGE (OUTPATIENT)
Dept: FAMILY MEDICINE | Facility: CLINIC | Age: 75
End: 2025-07-21
Payer: MEDICARE

## 2025-07-21 ENCOUNTER — TELEPHONE (OUTPATIENT)
Facility: CLINIC | Age: 75
End: 2025-07-21
Payer: MEDICARE

## 2025-07-21 ENCOUNTER — OFFICE VISIT (OUTPATIENT)
Facility: CLINIC | Age: 75
End: 2025-07-21
Payer: MEDICARE

## 2025-07-21 VITALS — WEIGHT: 136.69 LBS | BODY MASS INDEX: 24.22 KG/M2 | HEIGHT: 63 IN

## 2025-07-21 DIAGNOSIS — M65.342 TRIGGER RING FINGER OF LEFT HAND: Primary | ICD-10-CM

## 2025-07-21 DIAGNOSIS — I36.1 NONRHEUMATIC TRICUSPID VALVE REGURGITATION: ICD-10-CM

## 2025-07-21 DIAGNOSIS — M79.642 LEFT HAND PAIN: ICD-10-CM

## 2025-07-21 DIAGNOSIS — M72.0 DUPUYTREN'S DISEASE OF PALM OF LEFT HAND: ICD-10-CM

## 2025-07-21 DIAGNOSIS — M65.342 TRIGGER RING FINGER OF LEFT HAND: ICD-10-CM

## 2025-07-21 DIAGNOSIS — R01.1 SYSTOLIC MURMUR: Primary | ICD-10-CM

## 2025-07-21 PROCEDURE — 1125F AMNT PAIN NOTED PAIN PRSNT: CPT | Mod: CPTII,S$GLB,,

## 2025-07-21 PROCEDURE — 99214 OFFICE O/P EST MOD 30 MIN: CPT | Mod: 25,S$GLB,,

## 2025-07-21 PROCEDURE — 73140 X-RAY EXAM OF FINGER(S): CPT | Mod: TC,LT

## 2025-07-21 PROCEDURE — 73130 X-RAY EXAM OF HAND: CPT | Mod: TC,LT

## 2025-07-21 PROCEDURE — 3008F BODY MASS INDEX DOCD: CPT | Mod: CPTII,S$GLB,,

## 2025-07-21 PROCEDURE — 73130 X-RAY EXAM OF HAND: CPT | Mod: 26,LT,, | Performed by: RADIOLOGY

## 2025-07-21 PROCEDURE — 3044F HG A1C LEVEL LT 7.0%: CPT | Mod: CPTII,S$GLB,,

## 2025-07-21 PROCEDURE — 1159F MED LIST DOCD IN RCRD: CPT | Mod: CPTII,S$GLB,,

## 2025-07-21 PROCEDURE — 20550 NJX 1 TENDON SHEATH/LIGAMENT: CPT | Mod: LT,S$GLB,,

## 2025-07-21 PROCEDURE — 99999 PR PBB SHADOW E&M-EST. PATIENT-LVL III: CPT | Mod: PBBFAC,,,

## 2025-07-21 PROCEDURE — 1160F RVW MEDS BY RX/DR IN RCRD: CPT | Mod: CPTII,S$GLB,,

## 2025-07-21 RX ORDER — METHYLPREDNISOLONE ACETATE 40 MG/ML
40 INJECTION, SUSPENSION INTRA-ARTICULAR; INTRALESIONAL; INTRAMUSCULAR; SOFT TISSUE
Status: DISCONTINUED | OUTPATIENT
Start: 2025-07-21 | End: 2025-07-21 | Stop reason: HOSPADM

## 2025-07-21 RX ADMIN — METHYLPREDNISOLONE ACETATE 40 MG: 40 INJECTION, SUSPENSION INTRA-ARTICULAR; INTRALESIONAL; INTRAMUSCULAR; SOFT TISSUE at 02:07

## 2025-07-21 NOTE — PROGRESS NOTES
[facility]  History & Physical  Orthopedics    Subjective:      Chief Complaint: Pain of the Left Hand    Referring Provider: Jaswant Live MD     History of Present Illness:  Shraddha Rea is a 74 y.o. right hand dominant female who presents to clinic today with left ring finger triggering and soreness for about 3 weeks.   The affected finger gets stuck in a certain position, particularly in the morning when it is stiff and sore. She reports inability to make a fist completely, with the finger occasionally locking when gripping and when attempting full flexion. Throbbing pain is reported throughout the finger, worse at night and in the morning. No specific injury or trauma to the hand is reported, though she mentions moving boxes in the garage without recalling hitting the hand. She does note that she mows the lawn and gardens. To alleviate symptoms, compression gloves were used, which seemed to help initially but were ineffective the night before the visit. Massaging the affected area sometimes provides relief. A similar episode in the right hand occurred approximately three years ago, which resolved on its own after about a month. She denies any numbness or tingling in the hands. Patient denies any prior history of trauma or surgeries to the wrist or hand.    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    Vitals:    07/21/25 1356   PainSc:   3   PainLoc: Finger     Review of Systems:  Constitutional: no fever or chills  Eyes: no visual changes  ENT: no nasal congestion or sore throat  Respiratory: no cough or shortness of breath  Cardiovascular: no chest pain  Gastrointestinal: no nausea or vomiting, tolerating diet  Musculoskeletal: + pain, soreness, and  decreased ROM    Past Medical History:   Diagnosis Date    Arthritis     Personal history of colonic polyps 10/18/2021    2 mm ployp in the cecum,2 mm polyp in the descending colon     Past Surgical History:   Procedure Laterality Date    APPENDECTOMY      HYSTERECTOMY      TOTAL REPLACEMENT OF HIP JOINT USING COMPUTER-ASSISTED NAVIGATION Left 10/16/2024    Procedure: ARTHROPLASTY, HIP, TOTAL, USING COMPUTER-ASSISTED NAVIGATION;  Surgeon: Ten Melendez MD;  Location: [facility];  Service: Orthopedics;  Laterality: Left;  Same Day. Eddi SOUSA FIRST ASSIST ALEXANDER NOTIFIED-GG  RN PREOP 10/02/2024, TYPE & SCREEN 10/14/2024-done------CLEARED BY PCP---NEED H/P     Family History   Problem Relation Name Age of Onset    Breast cancer Mother  60    Lung cancer Sister      Lupus Sister      Cancer Maternal Grandmother      Breast cancer Maternal Cousin       Social History     Socioeconomic History    Marital status:     Number of children: 2   Tobacco Use    Smoking status: Never     Passive exposure: Never    Smokeless tobacco: Never   Substance and Sexual Activity    Alcohol use: Not Currently     Comment: occasional social    Drug use: Never    Sexual activity: Not Currently     Social Drivers of Health     Financial Resource Strain: Low Risk  (7/16/2025)    Overall Financial Resource Strain (CARDIA)     Difficulty of Paying Living Expenses: Not hard at all   Food Insecurity: No Food Insecurity (7/16/2025)    Hunger Vital Sign     Worried About Running Out of Food in the Last Year: Never true     Ran Out of Food in the Last Year: Never true   Transportation Needs: No Transportation Needs (7/16/2025)    PRAPARE - Transportation     Lack of Transportation (Medical): No     Lack of Transportation (Non-Medical): No   Physical Activity: Sufficiently Active (7/16/2025)    Exercise Vital Sign     Days of Exercise per Week: 3 days     Minutes of Exercise per Session: 50 min   Stress: No Stress Concern  "Present (7/16/2025)    [employer] - Occupational Stress Questionnaire     Feeling of Stress : Not at all   Housing Stability: Low Risk  (7/16/2025)    Housing Stability Vital Sign     Unable to Pay for Housing in the Last Year: No     Number of Times Moved in the Last Year: 0     Homeless in the Last Year: No       Current Medications[1]  Review of patient's allergies indicates:   Allergen Reactions    Mold Shortness Of Breath    Aspirin     Grass pollen-june grass standard Itching    Sulfa (sulfonamide antibiotics)     Sulfamethoxazole Other (See Comments)    Amoxicillin Nausea Only and Other (See Comments)     Pt states ---just felt worse after use    Milk containing products (dairy) Nausea And Vomiting and Other (See Comments)       Objective:   Vital Signs (Most Recent):  Vitals:    07/21/25 1356   Weight: 62 kg (136 lb 11 oz)   Height: 5' 3" (1.6 m)     Body mass index is 24.21 kg/m².    Physical Exam:  Constitutional: The patient appears well-developed and well-nourished. No distress.   Skin: No lesions appreciated  Head: Normocephalic and atraumatic.   Nose: Nose normal.   Ears: No deformities seen  Eyes: Conjunctivae and EOM are normal.   Neck: No tracheal deviation present.   Cardiovascular: Normal rate and intact distal pulses.    Pulmonary/Chest: Effort normal. No respiratory distress.   Abdominal: There is no guarding.   Neurological: The patient is alert.   Psychiatric: The patient has a normal mood and affect.     Bilateral Hand/Wrist/Elbow Examination:    Observation/Inspection:  Swelling  Left ring A1 pulley, Dupuytrens nodule left palm.     Deformity  none  Discoloration  none     Scars   none    Atrophy  none    HAND/WRIST/ELBOW EXAMINATION:  Thickened nodule in palm of left ring finger with associated pitting and tethering in the palm, no extension into the finger or flexion contracture.   TTP left ring A1 pulley with palpable nodule and swelling over flexor tendon " sheath, decreased and painful finger flexion, demonstrable catching and triggering with active/passive flexion and extension of the ring finger. Otherwise, bilateral ROM hand/wrist/elbow full and painless.    Finkelstein's Test   Neg  WHAT Test    Neg  CMC grind    Neg  Circumduction test   Neg  Snuff box Tenderness   Neg  Patel's Test    Neg  TFCC Compression Test  Neg  Hook of Hamate Tenderness  Neg  Median Nerve Compression Test Neg  Tinel's Test - Carpal Tunnel  Neg  Phalen's Test    Neg  Tinel's Test - Cubital Tunnel  Neg  Elbow Flexion Test    Neg    Neurovascular Exam:  Digits WWP, brisk CR < 3s throughout  NVI motor/LTS to M/R/U nerves, radial pulse 2+  2+ biceps and brachioradialis reflexes    Diagnostics Review:   Imaging: I independently viewed the patient's imaging as well as the radiology report.    My personal interpretation of X-rays AP, lateral, oblique left hand taken today demonstrate mild degenerative joint disease scattered in hand, most notable in the IP joints and basilar thumb, no acute fracture or dislocation.    Assessment:   74 y.o. yo female with   Encounter Diagnoses   Name Primary?    Left hand pain     Trigger ring finger of left hand Yes    Dupuytren's disease of palm of left hand       Plan:   The patient and I had a thorough discussion today. We discussed the working diagnosis as well as several other potential alternative diagnoses. Treatment options were discussed, both conservative and surgical. Conservative treatment options would include things such as activity modifications, workplace modifications, a period of rest, ice/heat, oral vs topical OTC and prescription anti-inflammatory medications, acetaminophen, occupational therapy to include strengthening and range of motion exercises, splinting/bracing, immobilization, corticosteroid injections for temporary relief. Surgical options were discussed as well.     Patient would like to proceed with steroid injection for left ring  trigger finger. I administered steroid injection into the left ring flexor tendon sheath today in clinic, patient tolerated the procedure well.   We discussed Dupuytren's disease and treatment options. At this time I recommend monitoring the hand for progression and development of contracture.   Follow up in 4 weeks to assess efficacy of injection  Call with any questions/concerns in the interim    PATIENT INSTRUCTIONS:  - Apply ice to the injection site if experiencing increased pain in the next 3-5 days.  - Avoid mowing the lawn to reduce gripping and vibration that may exacerbate hand issues.  - Monitor for any changes related to Dupuytren's disease, such as inability to lay finger flat, finger contracture, or functional limitations.    Injection Procedure:  -I have offered her a selective injection. I have explained the risks, benefits, and alternatives of the procedure in detail.  The patient voices understanding and all questions have been answered. The patient agrees to proceed as planned. So after a sterile prep of the skin in the normal fashion the left ring flexor tendon sheath injected using a 25 gauge needle with a combination of 1 cc 1% plain lidocaine and 40 mg of methylprednisolone.  The patient is cautioned and immediate relief of pain is secondary to the local anesthetic and will be temporary.  After the anesthetic wears off there may be a increase in pain that may last for a few hours or a few days and they should use ice to help alleviate this flair up of pain. Patient tolerated the procedure well.     Should the patient's symptoms worsen, persist, or fail to improve they should return for reevaluation.     The patient's pathophysiology was explained in detail with reference to x-rays, models, other visual aids as appropriate. Treatment options were discussed in detail.  Questions were invited and answered to the patient's satisfaction. I reviewed Primary care and other specialty's notes to better  coordinate patient's care.    Desi Melissa PA-C  [facility]     This note was generated with the assistance of ambient listening technology. Verbal consent was obtained by the patient and accompanying visitor(s) for the recording of patient appointment to facilitate this note. I attest to having reviewed and edited the generated note for accuracy, though some syntax or spelling errors may persist. Please contact the author of this note for any clarification.     Please be aware that this note has been generated with the assistance of MModal voice-to-text.  Please excuse any spelling or grammatical errors.         [1]   Current Outpatient Medications   Medication Sig Dispense Refill    acetaminophen (TYLENOL) 500 MG tablet Take 2 tablets (1,000 mg total) by mouth every 8 (eight) hours as needed for Pain. 42 tablet 0    fexofenadine (ALLEGRA) 180 MG tablet Take 1 tablet (180 mg total) by mouth once daily.      fluticasone propionate (FLONASE) 50 mcg/actuation nasal spray 1 spray by Each Nostril route as needed for Rhinitis.       No current facility-administered medications for this visit.

## 2025-07-22 NOTE — PROCEDURES
Tendon Sheath    Date/Time: 7/21/2025 2:00 PM    Performed by: Desi Melissa PA-C  Authorized by: Desi Melissa PA-C    Consent Done?:  Yes (Verbal)  Indications:  Pain  Timeout: prior to procedure the correct patient, procedure, and site was verified    Local anesthesia used?: Yes    Anesthesia:  Local infiltration  Local anesthetic:  Lidocaine 1% without epinephrine  Anesthetic total (ml):  1    Location:  Ring finger  Site:  L ring flexor tendon sheath  Needle size:  25 G  Approach:  Volar  Medications:  40 mg methylPREDNISolone acetate 40 mg/mL  Patient tolerance:  Patient tolerated the procedure well with no immediate complications

## 2025-07-23 ENCOUNTER — OFFICE VISIT (OUTPATIENT)
Dept: FAMILY MEDICINE | Facility: CLINIC | Age: 75
End: 2025-07-23
Payer: MEDICARE

## 2025-07-23 ENCOUNTER — APPOINTMENT (OUTPATIENT)
Dept: RADIOLOGY | Facility: HOSPITAL | Age: 75
End: 2025-07-23
Attending: NURSE PRACTITIONER
Payer: MEDICARE

## 2025-07-23 VITALS
SYSTOLIC BLOOD PRESSURE: 136 MMHG | TEMPERATURE: 98 F | WEIGHT: 138.44 LBS | HEIGHT: 63 IN | HEART RATE: 77 BPM | DIASTOLIC BLOOD PRESSURE: 74 MMHG | RESPIRATION RATE: 18 BRPM | BODY MASS INDEX: 24.53 KG/M2 | OXYGEN SATURATION: 97 %

## 2025-07-23 DIAGNOSIS — R05.9 COUGH IN ADULT PATIENT: ICD-10-CM

## 2025-07-23 DIAGNOSIS — R06.09 EXERTIONAL DYSPNEA: Primary | ICD-10-CM

## 2025-07-23 DIAGNOSIS — J30.89 ALLERGIC RHINITIS DUE TO OTHER ALLERGIC TRIGGER, UNSPECIFIED SEASONALITY: ICD-10-CM

## 2025-07-23 DIAGNOSIS — R06.09 EXERTIONAL DYSPNEA: ICD-10-CM

## 2025-07-23 PROCEDURE — 93005 ELECTROCARDIOGRAM TRACING: CPT | Mod: S$GLB,,, | Performed by: NURSE PRACTITIONER

## 2025-07-23 PROCEDURE — 71046 X-RAY EXAM CHEST 2 VIEWS: CPT | Mod: TC,PN

## 2025-07-23 PROCEDURE — 3008F BODY MASS INDEX DOCD: CPT | Mod: CPTII,S$GLB,, | Performed by: NURSE PRACTITIONER

## 2025-07-23 PROCEDURE — 1160F RVW MEDS BY RX/DR IN RCRD: CPT | Mod: CPTII,S$GLB,, | Performed by: NURSE PRACTITIONER

## 2025-07-23 PROCEDURE — 3288F FALL RISK ASSESSMENT DOCD: CPT | Mod: CPTII,S$GLB,, | Performed by: NURSE PRACTITIONER

## 2025-07-23 PROCEDURE — 3075F SYST BP GE 130 - 139MM HG: CPT | Mod: CPTII,S$GLB,, | Performed by: NURSE PRACTITIONER

## 2025-07-23 PROCEDURE — 1101F PT FALLS ASSESS-DOCD LE1/YR: CPT | Mod: CPTII,S$GLB,, | Performed by: NURSE PRACTITIONER

## 2025-07-23 PROCEDURE — 93010 ELECTROCARDIOGRAM REPORT: CPT | Mod: S$GLB,,, | Performed by: INTERNAL MEDICINE

## 2025-07-23 PROCEDURE — 1159F MED LIST DOCD IN RCRD: CPT | Mod: CPTII,S$GLB,, | Performed by: NURSE PRACTITIONER

## 2025-07-23 PROCEDURE — 71046 X-RAY EXAM CHEST 2 VIEWS: CPT | Mod: 26,,, | Performed by: RADIOLOGY

## 2025-07-23 PROCEDURE — G2211 COMPLEX E/M VISIT ADD ON: HCPCS | Mod: S$GLB,,, | Performed by: NURSE PRACTITIONER

## 2025-07-23 PROCEDURE — 99999 PR PBB SHADOW E&M-EST. PATIENT-LVL IV: CPT | Mod: PBBFAC,,, | Performed by: NURSE PRACTITIONER

## 2025-07-23 PROCEDURE — 3044F HG A1C LEVEL LT 7.0%: CPT | Mod: CPTII,S$GLB,, | Performed by: NURSE PRACTITIONER

## 2025-07-23 PROCEDURE — 3078F DIAST BP <80 MM HG: CPT | Mod: CPTII,S$GLB,, | Performed by: NURSE PRACTITIONER

## 2025-07-23 PROCEDURE — 1126F AMNT PAIN NOTED NONE PRSNT: CPT | Mod: CPTII,S$GLB,, | Performed by: NURSE PRACTITIONER

## 2025-07-23 PROCEDURE — 99214 OFFICE O/P EST MOD 30 MIN: CPT | Mod: S$GLB,,, | Performed by: NURSE PRACTITIONER

## 2025-07-23 RX ORDER — FLUTICASONE PROPIONATE 50 MCG
1 SPRAY, SUSPENSION (ML) NASAL DAILY
Qty: 15.8 ML | Refills: 0 | Status: SHIPPED | OUTPATIENT
Start: 2025-07-23

## 2025-07-23 RX ORDER — ALBUTEROL SULFATE 90 UG/1
2 INHALANT RESPIRATORY (INHALATION) EVERY 6 HOURS PRN
Qty: 18 G | Refills: 1 | Status: SHIPPED | OUTPATIENT
Start: 2025-07-23 | End: 2025-07-23

## 2025-07-23 RX ORDER — ALBUTEROL SULFATE 90 UG/1
2 INHALANT RESPIRATORY (INHALATION) EVERY 6 HOURS PRN
Qty: 18 G | Refills: 1 | Status: SHIPPED | OUTPATIENT
Start: 2025-07-23

## 2025-07-23 RX ORDER — AZELASTINE 1 MG/ML
1 SPRAY, METERED NASAL 2 TIMES DAILY
Qty: 30 ML | Refills: 0 | Status: SHIPPED | OUTPATIENT
Start: 2025-07-23 | End: 2026-07-23

## 2025-07-23 NOTE — PROGRESS NOTES
Routine Office Visit    Patient Name: Shraddha Rea    : 1950  MRN: [id number]    Chief Complaint:  Cough, congestion, postnasal drip, chest tightness    Subjective:  Shraddha is a 74 y.o. female who presents today for:    Cough, congestion, postnasal drip, chest tightness - patient who is known to me reports today for evaluation.  For the last 8 days she has developed some pretty consistent nasal congestion and postnasal drip as well as some sternal chest tightness.  She states that her symptoms feel similar to when she had a severe episode of bronchitis in the past.  She did cut grass twice in the last week; she states she did get some exertional dyspnea when cutting the grass but only when wearing a mask outside.  She does do [employer] classes regularly and does not get any exertional dyspnea then.  She does not report any palpitations and denies any leg swelling or orthopnea.  Denies any temperature above 100.4.  She has taken Allegra some benefit.  She denies any wheezing.  States that the coughing is only mild.  She did have an echocardiogram done earlier this year in January with results as follows:     Left Ventricle: The left ventricle is normal in size. Ventricular mass is normal. Normal wall thickness. There is concentric remodeling. Normal wall motion. There is normal systolic function with a visually estimated ejection fraction of 60 - 65%. There is normal diastolic function.    Right Ventricle: Normal right ventricular cavity size. Wall thickness is normal. Systolic function is normal.    Left Atrium: Left atrium is mildly dilated.    Aortic Valve: There is mild aortic regurgitation.    Mitral Valve: There is mild regurgitation.    Tricuspid Valve: There is moderate regurgitation.    Pulmonary Artery: The estimated pulmonary artery systolic pressure is 33 mmHg.    IVC/SVC: Normal venous pressure at 3 mmHg.    At that time, we had discussed potentially setting up a cardiology evaluation or  "repeating an echocardiogram in 6 months, but patient had deferred further workup at that time.    Past Medical History  Past Medical History:   Diagnosis Date    Arthritis     Personal history of colonic polyps 10/18/2021    2 mm ployp in the cecum,2 mm polyp in the descending colon       Family History  Family History   Problem Relation Name Age of Onset    Breast cancer Mother  60    Lung cancer Sister      Lupus Sister      Cancer Maternal Grandmother      Breast cancer Maternal Cousin         Current Medications  Medications Ordered Prior to Encounter[1]    Allergies   Review of patient's allergies indicates:   Allergen Reactions    Mold Shortness Of Breath    Aspirin     Grass pollen-june grass standard Itching    Sulfa (sulfonamide antibiotics)     Sulfamethoxazole Other (See Comments)    Amoxicillin Nausea Only and Other (See Comments)     Pt states ---just felt worse after use    Milk containing products (dairy) Nausea And Vomiting and Other (See Comments)       Review of Systems (Pertinent positives)  Review of Systems   Constitutional:  Negative for chills and fever.   HENT:  Positive for congestion. Negative for ear pain, hearing loss, sinus pain, sore throat and tinnitus.    Eyes: Negative.    Respiratory:  Positive for cough, sputum production and shortness of breath. Negative for hemoptysis, wheezing and stridor.    Cardiovascular:  Positive for chest pain ("tightness"). Negative for palpitations, orthopnea and claudication.   Gastrointestinal: Negative.    Genitourinary: Negative.    Skin: Negative.    Neurological: Negative.    Endo/Heme/Allergies: Negative.        /74 (BP Location: Right arm, Patient Position: Sitting)   Pulse 77   Temp 98.1 °F (36.7 °C) (Oral)   Resp 18   Ht 5' 3" (1.6 m)   Wt 62.8 kg (138 lb 7.2 oz)   SpO2 97%   BMI 24.53 kg/m²     Physical Exam  Vitals reviewed.   Constitutional:       General: She is not in acute distress.     Appearance: Normal appearance. She is " not ill-appearing, toxic-appearing or diaphoretic.   HENT:      Head: Normocephalic and atraumatic.      Nose: Nose normal. No congestion or rhinorrhea.      Mouth/Throat:      Mouth: Mucous membranes are moist.      Pharynx: Uvula midline. Postnasal drip present. No pharyngeal swelling, oropharyngeal exudate, posterior oropharyngeal erythema or uvula swelling.      Tonsils: No tonsillar exudate or tonsillar abscesses.   Cardiovascular:      Rate and Rhythm: Normal rate and regular rhythm.      Pulses: Normal pulses.      Heart sounds: Normal heart sounds.   Pulmonary:      Effort: Pulmonary effort is normal. No respiratory distress.      Breath sounds: Normal breath sounds. No wheezing.   Abdominal:      General: There is no distension.      Tenderness: There is no abdominal tenderness.   Musculoskeletal:         General: No swelling, tenderness or deformity. Normal range of motion.   Skin:     General: Skin is warm and dry.      Capillary Refill: Capillary refill takes less than 2 seconds.   Neurological:      General: No focal deficit present.      Mental Status: She is alert and oriented to person, place, and time.   Psychiatric:         Mood and Affect: Mood normal.         Behavior: Behavior normal.            Assessment/Plan:  Shraddha Rea is a 74 y.o. female who presents today for :    Shraddha was seen today for cough, chest pain and nasal congestion.    Diagnoses and all orders for this visit:    Exertional dyspnea  -     IN OFFICE EKG 12-LEAD (to Muse)  -     X-Ray Chest PA And Lateral; Future  -     albuterol (VENTOLIN HFA) 90 mcg/actuation inhaler; Inhale 2 puffs into the lungs every 6 (six) hours as needed for Wheezing. Rescue    EKG done in office shows normal sinus rhythm.  Symptoms likely due to bronchitis/allergies.  Breath sounds clear today.  Chest x-ray ordered.  Will treat with p.r.n. albuterol for dyspnea and wheezing.  Will refer to Cardiology for further evaluation given elevated PA  pressures previously.    Allergic rhinitis due to other allergic trigger, unspecified seasonality  -     fluticasone propionate (FLONASE) 50 mcg/actuation nasal spray; 1 spray (50 mcg total) by Each Nostril route once daily.  -     azelastine (ASTELIN) 137 mcg (0.1 %) nasal spray; 1 spray (137 mcg total) by Nasal route 2 (two) times daily.  -     albuterol (VENTOLIN HFA) 90 mcg/actuation inhaler; Inhale 2 puffs into the lungs every 6 (six) hours as needed for Wheezing. Rescue    Treat with nasal sprays in addition to albuterol.  She does use nasal saline as well.    Cough in adult patient  -     X-Ray Chest PA And Lateral; Future    Check chest x-ray as above.        This office note has been dictated.  This dictation has been generated using M-Modal Fluency Direct dictation; some phonetic errors may occur.          [1]   Current Outpatient Medications on File Prior to Visit   Medication Sig Dispense Refill    acetaminophen (TYLENOL) 500 MG tablet Take 2 tablets (1,000 mg total) by mouth every 8 (eight) hours as needed for Pain. 42 tablet 0    fexofenadine (ALLEGRA) 180 MG tablet Take 1 tablet (180 mg total) by mouth once daily.      fluticasone propionate (FLONASE) 50 mcg/actuation nasal spray 1 spray by Each Nostril route as needed for Rhinitis.       No current facility-administered medications on file prior to visit.

## 2025-07-24 ENCOUNTER — PATIENT MESSAGE (OUTPATIENT)
Dept: FAMILY MEDICINE | Facility: CLINIC | Age: 75
End: 2025-07-24
Payer: MEDICARE

## 2025-07-24 ENCOUNTER — TELEPHONE (OUTPATIENT)
Dept: FAMILY MEDICINE | Facility: CLINIC | Age: 75
End: 2025-07-24
Payer: MEDICARE

## 2025-07-24 NOTE — TELEPHONE ENCOUNTER
Returned call to Shraddha who asked how long to take the medication that was prescribed on yesterday? Per SWATHI Iverson, NP You can use the albuterol inhaler just as needed for shortness of breath or wheezing.  Right now I would use the nasal sprays daily probably for the next 1-2 weeks to help with the symptoms and then you can use them as needed thereafter.Patient verbalized understanding.

## 2025-07-24 NOTE — TELEPHONE ENCOUNTER
Copied from CRM #[id number]. Topic: General Inquiry - Patient Advice  >> Jul 24, 2025  3:58 PM Joanna wrote:  Who called:self      What is the request in detail:pt is calling in regards of she wants to know how long to take the medication she got on yesterday she would like a callback      Can the clinic reply by [facility]?     Would the patient rather a call back or a response via [facility]?callback      Best call back number:..[phone]       Additional Information:

## 2025-07-25 NOTE — TELEPHONE ENCOUNTER
Message routed to Brad Iverson NP to answer question about length of use for medication. Questions & concerns addressed in earlier encounter.

## 2025-07-26 LAB
OHS QRS DURATION: 88 MS
OHS QTC CALCULATION: 427 MS

## 2025-07-29 ENCOUNTER — TELEPHONE (OUTPATIENT)
Dept: FAMILY MEDICINE | Facility: CLINIC | Age: 75
End: 2025-07-29
Payer: MEDICARE

## 2025-07-29 DIAGNOSIS — I70.0 AORTIC CALCIFICATION: Primary | ICD-10-CM

## 2025-07-29 RX ORDER — PRAVASTATIN SODIUM 40 MG/1
40 TABLET ORAL NIGHTLY
Qty: 90 TABLET | Refills: 1 | Status: SHIPPED | OUTPATIENT
Start: 2025-07-29 | End: 2026-07-29

## 2025-07-29 NOTE — TELEPHONE ENCOUNTER
Patient has been called and scheduled for upcoming lab appointment.  ----- Message from Brad Iverson NP sent at 7/29/2025  7:37 AM CDT -----  Yobani    I have contacted the patient about her results already.  Please call patient to schedule labs in 6 weeks fasting.  Thank you

## 2025-07-29 NOTE — PROGRESS NOTES
The 10-year ASCVD risk score (Kolby AMARO, et al., 2019) is: 20.3%    Values used to calculate the score:      Age: 74 years      Sex: Female      Is Non- : Yes      Diabetic: No      Tobacco smoker: No      Systolic Blood Pressure: 136 mmHg      Is BP treated: No      HDL Cholesterol: 73 mg/dL      Total Cholesterol: 222 mg/dL

## 2025-08-01 ENCOUNTER — OFFICE VISIT (OUTPATIENT)
Dept: CARDIOLOGY | Facility: CLINIC | Age: 75
End: 2025-08-01
Payer: MEDICARE

## 2025-08-01 VITALS
HEART RATE: 79 BPM | OXYGEN SATURATION: 97 % | HEIGHT: 63 IN | RESPIRATION RATE: 15 BRPM | SYSTOLIC BLOOD PRESSURE: 142 MMHG | BODY MASS INDEX: 24.42 KG/M2 | WEIGHT: 137.81 LBS | DIASTOLIC BLOOD PRESSURE: 70 MMHG

## 2025-08-01 DIAGNOSIS — I35.1 MILD AORTIC REGURGITATION: ICD-10-CM

## 2025-08-01 DIAGNOSIS — I70.0 AORTIC ATHEROSCLEROSIS: Primary | ICD-10-CM

## 2025-08-01 DIAGNOSIS — R06.09 EXERTIONAL DYSPNEA: ICD-10-CM

## 2025-08-01 PROCEDURE — 99999 PR PBB SHADOW E&M-EST. PATIENT-LVL IV: CPT | Mod: PBBFAC,,, | Performed by: INTERNAL MEDICINE

## 2025-08-01 RX ORDER — NAPROXEN SODIUM 220 MG/1
81 TABLET, FILM COATED ORAL DAILY
COMMUNITY

## 2025-08-01 NOTE — PROGRESS NOTES
CARDIOVASCULAR PROGRESS NOTE    REASON FOR CONSULT:   Shraddha Rea is a 74 y.o. female who presents for establishment of cardiology care.    CARDIOVASCULAR HISTORY:   Hyperlipidemia - .4 in July, 2024  Abdominal aortic calcification  Mild aortic regurgitation    No diabetes mellitus with A1c 5.5 in July, 2025    HISTORY OF PRESENT ILLNESS:     She has been referred to Cardiology Clinic for evaluation of shortness of breath which has been getting for the last few weeks associated with phlegm and postnasal drip.  She has history of bronchiolitis and she used to be on inhalers.  Her PCP recently started her on albuterol and symptoms got much better.    At baseline she is fairly active and does yoga everyday.  She denies any symptoms with activities of daily life.  No presyncopal or syncopal episode.  No lower extremity edema.    She was recently started on pravastatin by her PCP.    No family history of premature CAD.  She does not smoke cigarettes.  She does not drink alcohol.    PAST MEDICAL HISTORY:     Past Medical History:   Diagnosis Date    Arthritis     Personal history of colonic polyps 10/18/2021    2 mm ployp in the cecum,2 mm polyp in the descending colon       PAST SURGICAL HISTORY:     Past Surgical History:   Procedure Laterality Date    APPENDECTOMY      HYSTERECTOMY      TOTAL REPLACEMENT OF HIP JOINT USING COMPUTER-ASSISTED NAVIGATION Left 10/16/2024    Procedure: ARTHROPLASTY, HIP, TOTAL, USING COMPUTER-ASSISTED NAVIGATION;  Surgeon: Ten Melendez MD;  Location: [facility];  Service: Orthopedics;  Laterality: Left;  Same Day. Eddi SOUSA FIRST ASSIST ALEXANDER NOTIFIED-GG  RN PREOP 10/02/2024, TYPE & SCREEN 10/14/2024-done------CLEARED BY PCP---NEED H/P       ALLERGIES AND MEDICATION:     Review of patient's allergies indicates:   Allergen Reactions    Mold Shortness Of Breath    Aspirin     Grass pollen-june grass standard Itching    Sulfa (sulfonamide antibiotics)      Sulfamethoxazole Other (See Comments)    Amoxicillin Nausea Only and Other (See Comments)     Pt states ---just felt worse after use    Milk containing products (dairy) Nausea And Vomiting and Other (See Comments)        Medication List            Accurate as of August 1, 2025  2:42 PM. If you have any questions, ask your nurse or doctor.                CONTINUE taking these medications      acetaminophen 500 MG tablet  Commonly known as: TYLENOL  Take 2 tablets (1,000 mg total) by mouth every 8 (eight) hours as needed for Pain.     albuterol 90 mcg/actuation inhaler  Commonly known as: VENTOLIN HFA  Inhale 2 puffs into the lungs every 6 (six) hours as needed for Wheezing or Shortness of Breath. Rescue     azelastine 137 mcg (0.1 %) nasal spray  Commonly known as: ASTELIN  1 spray (137 mcg total) by Nasal route 2 (two) times daily.     fexofenadine 180 MG tablet  Commonly known as: ALLEGRA  Take 1 tablet (180 mg total) by mouth once daily.     * fluticasone propionate 50 mcg/actuation nasal spray  Commonly known as: FLONASE     * fluticasone propionate 50 mcg/actuation nasal spray  Commonly known as: FLONASE  1 spray (50 mcg total) by Each Nostril route once daily.     pravastatin 40 MG tablet  Commonly known as: PRAVACHOL  Take 1 tablet (40 mg total) by mouth every evening.           * This list has 2 medication(s) that are the same as other medications prescribed for you. Read the directions carefully, and ask your doctor or other care provider to review them with you.                  SOCIAL HISTORY:   Social History[1]    FAMILY HISTORY:     Family History   Problem Relation Name Age of Onset    Breast cancer Mother  60    Lung cancer Sister      Lupus Sister      Cancer Maternal Grandmother      Breast cancer Maternal Cousin         REVIEW OF SYSTEMS:   ROS    Constitution: Negative for chills, fever, weight gain and weight loss.   Eyes: Negative for blurry vision, visual changes    Cardiovascular: Negative for  "chest pain. Negative for claudication, dyspnea on exertion, leg swelling, orthopnea, palpitations, paroxysmal nocturnal dyspnea.   Respiratory: Negative for shortness of breath. Negative for cough.    Endocrine: Negative for heat or cold intolerance    Hematologic/Lymphatic: Negative for easy bruising or bleeding    Skin: Negative for color change and rash.   Musculoskeletal: Negative for neck pain, arthralgias, myalgias    Gastrointestinal: Negative for abdominal pain, nausea, vomiting, diarrhea  Neurological: Negative for dizziness, light-headedness and loss of balance.   Psychiatric/Behavioral: Negative for altered mental status.    PHYSICAL EXAM:     Vitals:    08/01/25 1430   BP: (!) 142/70   Pulse: 79   Resp: 15    Body mass index is 24.41 kg/m².  Weight: 62.5 kg (137 lb 12.6 oz)   Height: 5' 3" (160 cm)     Gen: NAD  Head/Eyes/Ears/Nose: MMM, good dentition   Neck: No carotid bruits, no JVD  Lung: Clear to auscultation bilaterally, no wheezes/rales/ronchi, symmetrical lung expansion with inspiration  Heart: Normal S1/S2, regular rate and rhythm, no murmurs/rubs/gallops  Abdomen: Soft, NT/ND, no masses  Extremities: No lower extremity edema.  No wounds or other skin lesions  Skin: Normal color and turgor. No wounds rashes, no petechia, no ecchymoses.   Neuro: AAOx3    DATA:     Laboratory:  CBC:  Recent Labs   Lab 03/19/24  0747 10/02/24  0945 07/21/25  0731   WBC 3.94 4.94 3.91   Hemoglobin 12.6 12.9  --    HGB  --   --  13.2   Hematocrit 38.2 39.4  --    HCT  --   --  39.5   Platelet Count  --   --  245   Platelets 237 293  --        CHEMISTRIES:  Recent Labs   Lab 03/19/24  0747 09/04/24  0825 10/02/24  0945 07/21/25  0731   Glucose 87 132 H 73 86   Sodium 142 142 141 143   Potassium 4.5 3.7 4.2 3.9   BUN 12 12 10 11   Creatinine 0.9 0.9 0.8 0.8   eGFR >60 >60 >60 >60   Calcium 9.4 9.4 9.6 9.3       CARDIAC BIOMARKERS:        HBA1C:  Hemoglobin A1C   Date Value Ref Range Status   03/19/2024 5.4 4.0 - 5.6 % " Final     Comment:     ADA Screening Guidelines:  5.7-6.4%  Consistent with prediabetes  >or=6.5%  Consistent with diabetes    High levels of fetal hemoglobin interfere with the HbA1C  assay. Heterozygous hemoglobin variants (HbS, HgC, etc)do  not significantly interfere with this assay.   However, presence of multiple variants may affect accuracy.       Hemoglobin A1c   Date Value Ref Range Status   2025 5.5 4.0 - 5.6 % Final     Comment:     ADA Screening Guidelines:  5.7-6.4%  Consistent with prediabetes  >=6.5%  Consistent with diabetes    High levels of fetal hemoglobin interfere with the HbA1C  assay. Heterozygous hemoglobin variants (HbS, HgC, etc)do  not significantly interfere with this assay.   However, presence of multiple variants may affect accuracy.        COAGS:  Recent Labs   Lab 10/02/24  0945   INR 1.0       LIPIDS/LFTS:  Recent Labs   Lab 24  0747 24  0825 10/02/24  0945 25  0731   Cholesterol Total  --   --   --  222 H   Cholesterol 215 H  --   --   --    Triglycerides 107  --   --   --    Triglyceride  --   --   --  78   HDL 64  --   --   --    HDL Cholesterol  --   --   --  73   LDL Cholesterol 129.6  --   --  133.4   Non-HDL Cholesterol 151  --   --   --    Non HDL Cholesterol  --   --   --  149   AST 22 28 22 20   ALT 9 L 18 15 10         CARDIAC DIAGNOSTICS:  :     [id number]  Sinus rhythm without any ST-T wave change    ECHO  2025    Left Ventricle: The left ventricle is normal in size. Ventricular mass is normal. Normal wall thickness. There is concentric remodeling. Normal wall motion. There is normal systolic function with a visually estimated ejection fraction of 60 - 65%. There is normal diastolic function.    Right Ventricle: Normal right ventricular cavity size. Wall thickness is normal. Systolic function is normal.    Left Atrium: Left atrium is mildly dilated.    Aortic Valve: There is mild aortic regurgitation.    Mitral Valve: There is mild  regurgitation.    Tricuspid Valve: There is moderate regurgitation.    Pulmonary Artery: The estimated pulmonary artery systolic pressure is 33 mmHg.    IVC/SVC: Normal venous pressure at 3 mmHg.    3.  STRESS TEST  NA    4.  CARDIAC CATHETERIZATION  NA    5.  IMAGING   CT in July, 2025 showed abdominal aortic calcification    ASSESSMENT:   Bronchiolitis - stable on inhalers  Hyperlipidemia - .4 in July, 2024  Abdominal aortic calcification  Mild aortic regurgitation    Doing very well from cardiac perspective.  Doubt her shortness of breath is from any heart condition.    Blood pressure mildly elevated today but otherwise within normal limits.    PLAN:   To consider stress test in future  Continue with pravastatin 40 mg daily  Start baby aspirin  Repeat lipid profile, LFTs in November, 2025  Repeat echocardiogram in 6 months  Continue with Mediterranean diet and daily light exercise    Follow up in 6 months      Estrella Subramanian MD  [facility]      This note was created by combination of typed  and M-Modal dictation.   Transcription errors may be present.            [1]   Social History  Socioeconomic History    Marital status:     Number of children: 2   Tobacco Use    Smoking status: Never     Passive exposure: Never    Smokeless tobacco: Never   Substance and Sexual Activity    Alcohol use: Not Currently     Comment: occasional social    Drug use: Never    Sexual activity: Not Currently     Social Drivers of Health     Financial Resource Strain: Low Risk  (7/16/2025)    Overall Financial Resource Strain (CARDIA)     Difficulty of Paying Living Expenses: Not hard at all   Food Insecurity: No Food Insecurity (7/16/2025)    Hunger Vital Sign     Worried About Running Out of Food in the Last Year: Never true     Ran Out of Food in the Last Year: Never true   Transportation Needs: No Transportation Needs (7/16/2025)    PRAPARE - Transportation     Lack of  Transportation (Medical): No     Lack of Transportation (Non-Medical): No   Physical Activity: Sufficiently Active (7/16/2025)    Exercise Vital Sign     Days of Exercise per Week: 3 days     Minutes of Exercise per Session: 50 min   Stress: No Stress Concern Present (7/16/2025)    [employer] - Occupational Stress Questionnaire     Feeling of Stress : Not at all   Housing Stability: Low Risk  (7/16/2025)    Housing Stability Vital Sign     Unable to Pay for Housing in the Last Year: No     Number of Times Moved in the Last Year: 0     Homeless in the Last Year: No

## 2025-08-05 ENCOUNTER — RESULTS FOLLOW-UP (OUTPATIENT)
Dept: FAMILY MEDICINE | Facility: CLINIC | Age: 75
End: 2025-08-05
Payer: MEDICARE

## 2025-08-12 ENCOUNTER — PATIENT MESSAGE (OUTPATIENT)
Dept: ORTHOPEDICS | Facility: CLINIC | Age: 75
End: 2025-08-12
Payer: MEDICARE

## 2025-08-18 ENCOUNTER — CLINICAL SUPPORT (OUTPATIENT)
Dept: INFECTIOUS DISEASES | Facility: CLINIC | Age: 75
End: 2025-08-18

## 2025-08-18 ENCOUNTER — OFFICE VISIT (OUTPATIENT)
Dept: INFECTIOUS DISEASES | Facility: CLINIC | Age: 75
End: 2025-08-18

## 2025-08-18 VITALS
HEART RATE: 80 BPM | HEIGHT: 63 IN | TEMPERATURE: 99 F | SYSTOLIC BLOOD PRESSURE: 148 MMHG | DIASTOLIC BLOOD PRESSURE: 82 MMHG | WEIGHT: 139.56 LBS | BODY MASS INDEX: 24.73 KG/M2

## 2025-08-18 DIAGNOSIS — Z71.84 TRAVEL ADVICE ENCOUNTER: Primary | ICD-10-CM

## 2025-08-18 PROCEDURE — 99999 PR PBB SHADOW E&M-EST. PATIENT-LVL III: CPT | Mod: PBBFAC,,, | Performed by: INTERNAL MEDICINE

## 2025-08-18 PROCEDURE — 99999 PR PBB SHADOW E&M-EST. PATIENT-LVL I: CPT | Mod: PBBFAC,,,

## 2025-08-18 RX ORDER — AZITHROMYCIN 500 MG/1
500 TABLET, FILM COATED ORAL DAILY
Qty: 3 TABLET | Refills: 0 | Status: SHIPPED | OUTPATIENT
Start: 2025-08-18 | End: 2025-08-21

## 2025-09-03 ENCOUNTER — PATIENT OUTREACH (OUTPATIENT)
Dept: ADMINISTRATIVE | Facility: HOSPITAL | Age: 75
End: 2025-09-03
Payer: MEDICARE

## 2025-09-03 VITALS — DIASTOLIC BLOOD PRESSURE: 68 MMHG | SYSTOLIC BLOOD PRESSURE: 123 MMHG

## (undated) DEVICE — TAPE SURG DURAPORE 2 X10YD

## (undated) DEVICE — Device

## (undated) DEVICE — GLOVE BIOGEL SKINSENSE PI 8.0

## (undated) DEVICE — DRAPE THREE-QTR REINF 53X77IN

## (undated) DEVICE — DRAPE SURG W/TWL 17 5/8X23

## (undated) DEVICE — TOWEL WHITE OR DISP

## (undated) DEVICE — KIT IRR SUCTION HND PIECE

## (undated) DEVICE — BRUSH SCRUB HIBICLENS 4%

## (undated) DEVICE — GLOVE SENSICARE PI GRN 8

## (undated) DEVICE — SUT STRATAFIX 1 PDS CT-1

## (undated) DEVICE — DRESSING AQUACEL AG ADV 3.5X12

## (undated) DEVICE — SOL BETADINE 5%

## (undated) DEVICE — KIT DRAPE RIO ONE PIECE W/POCK

## (undated) DEVICE — SYS CLSR DERMABOND PRINEO 22CM

## (undated) DEVICE — TRIDENT X3 0 DEGREE POLYETHYLENE INSERT
Type: IMPLANTABLE DEVICE | Site: HIP | Status: NON-FUNCTIONAL
Brand: TRIDENT X3 INSERT
Removed: 2024-10-16

## (undated) DEVICE — ALCOHOL 70% ETHYL 16OZ.

## (undated) DEVICE — GLOVE BIOGEL SKINSENSE PI 6.5

## (undated) DEVICE — UNDERGLOVES BIOGEL PI SZ 7 LF

## (undated) DEVICE — KIT TOTAL HIP HPOFH OMC

## (undated) DEVICE — BLADE SAGITTAL 18 X 1.27 X 90M

## (undated) DEVICE — COVER LIGHT HANDLE 80/CA

## (undated) DEVICE — ELECTRODE REM PLYHSV RETURN 9

## (undated) DEVICE — GLOVE SIGNATURE ESSNTL LTX 7.5

## (undated) DEVICE — UNDERGLOVES BIOGEL PI SIZE 8

## (undated) DEVICE — DRAPE STERI INSTRUMENT 1018

## (undated) DEVICE — COVER PROXIMA MAYO STAND

## (undated) DEVICE — SUT VICRYL PLUS 2-0 CT1 18

## (undated) DEVICE — SUT ETHIBOND EXCEL 5-0 V-40

## (undated) DEVICE — DRAPE TOP 53X102IN

## (undated) DEVICE — HOOD T7 W/ PEEL AWAY LENS

## (undated) DEVICE — SPONGE COTTON TRAY 4X4IN

## (undated) DEVICE — KIT TRACKING VIZADISC HIP

## (undated) DEVICE — SUT QUILL MONODERM PS 2-0 30CM

## (undated) DEVICE — CERAMIC V40 FEMORAL HEAD
Type: IMPLANTABLE DEVICE | Site: HIP | Status: NON-FUNCTIONAL
Brand: BIOLOX
Removed: 2024-10-16

## (undated) DEVICE — BNDG COFLEX FOAM LF2 ST 6X5YD